# Patient Record
Sex: FEMALE | Race: WHITE | NOT HISPANIC OR LATINO | Employment: OTHER | URBAN - METROPOLITAN AREA
[De-identification: names, ages, dates, MRNs, and addresses within clinical notes are randomized per-mention and may not be internally consistent; named-entity substitution may affect disease eponyms.]

---

## 2017-11-01 ENCOUNTER — APPOINTMENT (OUTPATIENT)
Dept: LAB | Facility: CLINIC | Age: 77
End: 2017-11-01
Payer: COMMERCIAL

## 2017-11-01 ENCOUNTER — TRANSCRIBE ORDERS (OUTPATIENT)
Dept: LAB | Facility: CLINIC | Age: 77
End: 2017-11-01

## 2017-11-01 DIAGNOSIS — E55.9 AVITAMINOSIS D: ICD-10-CM

## 2017-11-01 DIAGNOSIS — Z13.9 SCREENING FOR CONDITION: Primary | ICD-10-CM

## 2017-11-01 LAB
25(OH)D3 SERPL-MCNC: 48.4 NG/ML (ref 30–100)
ALBUMIN SERPL BCP-MCNC: 4.1 G/DL (ref 3.5–5)
ALP SERPL-CCNC: 75 U/L (ref 46–116)
ALT SERPL W P-5'-P-CCNC: 18 U/L (ref 12–78)
ANION GAP SERPL CALCULATED.3IONS-SCNC: 11 MMOL/L (ref 4–13)
AST SERPL W P-5'-P-CCNC: 25 U/L (ref 5–45)
BILIRUB SERPL-MCNC: 1.2 MG/DL (ref 0.2–1)
BUN SERPL-MCNC: 29 MG/DL (ref 5–25)
CALCIUM SERPL-MCNC: 9.9 MG/DL (ref 8.3–10.1)
CHLORIDE SERPL-SCNC: 102 MMOL/L (ref 100–108)
CHOLEST SERPL-MCNC: 257 MG/DL (ref 50–200)
CO2 SERPL-SCNC: 25 MMOL/L (ref 21–32)
CREAT SERPL-MCNC: 1.32 MG/DL (ref 0.6–1.3)
ERYTHROCYTE [DISTWIDTH] IN BLOOD BY AUTOMATED COUNT: 12.6 % (ref 11.6–15.1)
GFR SERPL CREATININE-BSD FRML MDRD: 39 ML/MIN/1.73SQ M
GLUCOSE P FAST SERPL-MCNC: 93 MG/DL (ref 65–99)
HCT VFR BLD AUTO: 43.5 % (ref 34.8–46.1)
HDLC SERPL-MCNC: 133 MG/DL (ref 40–60)
HGB BLD-MCNC: 14.1 G/DL (ref 11.5–15.4)
LDLC SERPL CALC-MCNC: 108 MG/DL (ref 0–100)
MCH RBC QN AUTO: 31.4 PG (ref 26.8–34.3)
MCHC RBC AUTO-ENTMCNC: 32.4 G/DL (ref 31.4–37.4)
MCV RBC AUTO: 97 FL (ref 82–98)
PLATELET # BLD AUTO: 241 THOUSANDS/UL (ref 149–390)
PMV BLD AUTO: 11.9 FL (ref 8.9–12.7)
POTASSIUM SERPL-SCNC: 4.2 MMOL/L (ref 3.5–5.3)
PROT SERPL-MCNC: 7.5 G/DL (ref 6.4–8.2)
RBC # BLD AUTO: 4.49 MILLION/UL (ref 3.81–5.12)
SODIUM SERPL-SCNC: 138 MMOL/L (ref 136–145)
T3 SERPL-MCNC: 0.9 NG/ML (ref 0.6–1.8)
T4 SERPL-MCNC: 9 UG/DL (ref 4.7–13.3)
TRIGL SERPL-MCNC: 80 MG/DL
TSH SERPL DL<=0.05 MIU/L-ACNC: 2.08 UIU/ML (ref 0.36–3.74)
WBC # BLD AUTO: 5.89 THOUSAND/UL (ref 4.31–10.16)

## 2017-11-01 PROCEDURE — 84443 ASSAY THYROID STIM HORMONE: CPT

## 2017-11-01 PROCEDURE — 84480 ASSAY TRIIODOTHYRONINE (T3): CPT

## 2017-11-01 PROCEDURE — 84436 ASSAY OF TOTAL THYROXINE: CPT

## 2017-11-01 PROCEDURE — 85027 COMPLETE CBC AUTOMATED: CPT

## 2017-11-01 PROCEDURE — 80053 COMPREHEN METABOLIC PANEL: CPT

## 2017-11-01 PROCEDURE — 82306 VITAMIN D 25 HYDROXY: CPT

## 2017-11-01 PROCEDURE — 80061 LIPID PANEL: CPT

## 2017-11-01 PROCEDURE — 36415 COLL VENOUS BLD VENIPUNCTURE: CPT

## 2017-11-16 ENCOUNTER — TRANSCRIBE ORDERS (OUTPATIENT)
Dept: ADMINISTRATIVE | Facility: HOSPITAL | Age: 77
End: 2017-11-16

## 2017-11-16 ENCOUNTER — HOSPITAL ENCOUNTER (OUTPATIENT)
Dept: RADIOLOGY | Facility: HOSPITAL | Age: 77
Discharge: HOME | End: 2017-11-16
Payer: COMMERCIAL

## 2017-11-16 DIAGNOSIS — M89.9 BONE DISORDER: ICD-10-CM

## 2017-11-16 DIAGNOSIS — M54.5 LOW BACK PAIN, UNSPECIFIED BACK PAIN LATERALITY, UNSPECIFIED CHRONICITY, WITH SCIATICA PRESENCE UNSPECIFIED: ICD-10-CM

## 2017-11-16 DIAGNOSIS — I13.10 BENIGN HYPERTENSIVE HEART AND RENAL DISEASE: Primary | ICD-10-CM

## 2017-11-16 DIAGNOSIS — M54.5 LOW BACK PAIN, UNSPECIFIED BACK PAIN LATERALITY, UNSPECIFIED CHRONICITY, WITH SCIATICA PRESENCE UNSPECIFIED: Primary | ICD-10-CM

## 2017-11-16 PROCEDURE — 72110 X-RAY EXAM L-2 SPINE 4/>VWS: CPT

## 2017-12-04 ENCOUNTER — GENERIC CONVERSION - ENCOUNTER (OUTPATIENT)
Dept: OTHER | Facility: OTHER | Age: 77
End: 2017-12-04

## 2017-12-04 ENCOUNTER — APPOINTMENT (OUTPATIENT)
Dept: LAB | Facility: CLINIC | Age: 77
End: 2017-12-04
Payer: COMMERCIAL

## 2017-12-04 ENCOUNTER — TRANSCRIBE ORDERS (OUTPATIENT)
Dept: LAB | Facility: CLINIC | Age: 77
End: 2017-12-04

## 2017-12-04 DIAGNOSIS — I13.10 BENIGN HYPERTENSIVE HEART AND RENAL DISEASE: Primary | ICD-10-CM

## 2017-12-04 DIAGNOSIS — M54.50 LOW BACK PAIN: ICD-10-CM

## 2017-12-04 DIAGNOSIS — M51.37 OTHER INTERVERTEBRAL DISC DEGENERATION, LUMBOSACRAL REGION: ICD-10-CM

## 2017-12-04 DIAGNOSIS — Z00.00 ENCOUNTER FOR GENERAL ADULT MEDICAL EXAMINATION WITHOUT ABNORMAL FINDINGS: ICD-10-CM

## 2017-12-04 DIAGNOSIS — I13.10 BENIGN HYPERTENSIVE HEART AND RENAL DISEASE: ICD-10-CM

## 2017-12-04 LAB
ANION GAP SERPL CALCULATED.3IONS-SCNC: 8 MMOL/L (ref 4–13)
BUN SERPL-MCNC: 28 MG/DL (ref 5–25)
CALCIUM SERPL-MCNC: 9.6 MG/DL (ref 8.3–10.1)
CHLORIDE SERPL-SCNC: 101 MMOL/L (ref 100–108)
CO2 SERPL-SCNC: 26 MMOL/L (ref 21–32)
CREAT SERPL-MCNC: 0.99 MG/DL (ref 0.6–1.3)
GFR SERPL CREATININE-BSD FRML MDRD: 56 ML/MIN/1.73SQ M
GLUCOSE P FAST SERPL-MCNC: 118 MG/DL (ref 65–99)
POTASSIUM SERPL-SCNC: 4.3 MMOL/L (ref 3.5–5.3)
SODIUM SERPL-SCNC: 135 MMOL/L (ref 136–145)

## 2017-12-04 PROCEDURE — 36415 COLL VENOUS BLD VENIPUNCTURE: CPT

## 2017-12-04 PROCEDURE — 80048 BASIC METABOLIC PNL TOTAL CA: CPT

## 2017-12-08 ENCOUNTER — HOSPITAL ENCOUNTER (OUTPATIENT)
Dept: RADIOLOGY | Age: 77
Discharge: HOME | End: 2017-12-08
Payer: COMMERCIAL

## 2017-12-08 ENCOUNTER — GENERIC CONVERSION - ENCOUNTER (OUTPATIENT)
Dept: OTHER | Facility: OTHER | Age: 77
End: 2017-12-08

## 2017-12-08 DIAGNOSIS — I13.10 BENIGN HYPERTENSIVE HEART AND RENAL DISEASE: ICD-10-CM

## 2017-12-08 DIAGNOSIS — M89.9 BONE DISORDER: ICD-10-CM

## 2017-12-08 DIAGNOSIS — N18.9 CHRONIC KIDNEY DISEASE: ICD-10-CM

## 2017-12-08 PROCEDURE — 77080 DXA BONE DENSITY AXIAL: CPT

## 2017-12-08 PROCEDURE — 76770 US EXAM ABDO BACK WALL COMP: CPT

## 2017-12-15 ENCOUNTER — APPOINTMENT (OUTPATIENT)
Dept: PHYSICAL THERAPY | Facility: CLINIC | Age: 77
End: 2017-12-15
Payer: COMMERCIAL

## 2017-12-15 ENCOUNTER — GENERIC CONVERSION - ENCOUNTER (OUTPATIENT)
Dept: OTHER | Facility: OTHER | Age: 77
End: 2017-12-15

## 2017-12-15 DIAGNOSIS — Z00.00 ENCOUNTER FOR GENERAL ADULT MEDICAL EXAMINATION WITHOUT ABNORMAL FINDINGS: ICD-10-CM

## 2017-12-15 DIAGNOSIS — M54.50 LOW BACK PAIN: ICD-10-CM

## 2017-12-15 DIAGNOSIS — M51.37 OTHER INTERVERTEBRAL DISC DEGENERATION, LUMBOSACRAL REGION: ICD-10-CM

## 2017-12-15 PROCEDURE — G8991 OTHER PT/OT GOAL STATUS: HCPCS

## 2017-12-15 PROCEDURE — 97110 THERAPEUTIC EXERCISES: CPT

## 2017-12-15 PROCEDURE — 97161 PT EVAL LOW COMPLEX 20 MIN: CPT

## 2017-12-15 PROCEDURE — G8990 OTHER PT/OT CURRENT STATUS: HCPCS

## 2018-01-05 ENCOUNTER — APPOINTMENT (OUTPATIENT)
Dept: PHYSICAL THERAPY | Facility: CLINIC | Age: 78
End: 2018-01-05
Payer: COMMERCIAL

## 2018-01-05 PROCEDURE — 97110 THERAPEUTIC EXERCISES: CPT

## 2018-01-05 PROCEDURE — 97112 NEUROMUSCULAR REEDUCATION: CPT

## 2018-01-08 ENCOUNTER — APPOINTMENT (OUTPATIENT)
Dept: PHYSICAL THERAPY | Facility: CLINIC | Age: 78
End: 2018-01-08
Payer: COMMERCIAL

## 2018-01-08 PROCEDURE — 97110 THERAPEUTIC EXERCISES: CPT

## 2018-01-08 PROCEDURE — 97112 NEUROMUSCULAR REEDUCATION: CPT

## 2018-01-08 PROCEDURE — 97140 MANUAL THERAPY 1/> REGIONS: CPT

## 2018-01-10 ENCOUNTER — APPOINTMENT (OUTPATIENT)
Dept: PHYSICAL THERAPY | Facility: CLINIC | Age: 78
End: 2018-01-10
Payer: COMMERCIAL

## 2018-01-10 PROCEDURE — 97110 THERAPEUTIC EXERCISES: CPT

## 2018-01-10 PROCEDURE — 97112 NEUROMUSCULAR REEDUCATION: CPT

## 2018-01-15 ENCOUNTER — APPOINTMENT (OUTPATIENT)
Dept: PHYSICAL THERAPY | Facility: CLINIC | Age: 78
End: 2018-01-15
Payer: COMMERCIAL

## 2018-01-15 ENCOUNTER — GENERIC CONVERSION - ENCOUNTER (OUTPATIENT)
Dept: OTHER | Facility: OTHER | Age: 78
End: 2018-01-15

## 2018-01-15 PROCEDURE — 97110 THERAPEUTIC EXERCISES: CPT

## 2018-01-15 PROCEDURE — G8990 OTHER PT/OT CURRENT STATUS: HCPCS

## 2018-01-15 PROCEDURE — G8991 OTHER PT/OT GOAL STATUS: HCPCS

## 2018-01-15 PROCEDURE — 97112 NEUROMUSCULAR REEDUCATION: CPT

## 2018-01-17 ENCOUNTER — APPOINTMENT (OUTPATIENT)
Dept: PHYSICAL THERAPY | Facility: CLINIC | Age: 78
End: 2018-01-17
Payer: COMMERCIAL

## 2018-01-22 ENCOUNTER — APPOINTMENT (OUTPATIENT)
Dept: PHYSICAL THERAPY | Facility: CLINIC | Age: 78
End: 2018-01-22
Payer: COMMERCIAL

## 2018-01-24 ENCOUNTER — APPOINTMENT (OUTPATIENT)
Dept: PHYSICAL THERAPY | Facility: CLINIC | Age: 78
End: 2018-01-24
Payer: COMMERCIAL

## 2018-01-24 VITALS
DIASTOLIC BLOOD PRESSURE: 85 MMHG | WEIGHT: 108.38 LBS | HEIGHT: 61 IN | BODY MASS INDEX: 20.46 KG/M2 | HEART RATE: 76 BPM | SYSTOLIC BLOOD PRESSURE: 141 MMHG

## 2018-01-24 VITALS
BODY MASS INDEX: 20.46 KG/M2 | SYSTOLIC BLOOD PRESSURE: 149 MMHG | HEIGHT: 61 IN | DIASTOLIC BLOOD PRESSURE: 81 MMHG | WEIGHT: 108.38 LBS | HEART RATE: 81 BPM

## 2018-01-29 ENCOUNTER — APPOINTMENT (OUTPATIENT)
Dept: PHYSICAL THERAPY | Facility: CLINIC | Age: 78
End: 2018-01-29
Payer: COMMERCIAL

## 2018-01-31 ENCOUNTER — APPOINTMENT (OUTPATIENT)
Dept: PHYSICAL THERAPY | Facility: CLINIC | Age: 78
End: 2018-01-31
Payer: COMMERCIAL

## 2018-03-14 ENCOUNTER — OFFICE VISIT (OUTPATIENT)
Dept: NEPHROLOGY | Facility: CLINIC | Age: 78
End: 2018-03-14
Payer: COMMERCIAL

## 2018-03-14 VITALS
HEIGHT: 62 IN | BODY MASS INDEX: 19.69 KG/M2 | DIASTOLIC BLOOD PRESSURE: 82 MMHG | WEIGHT: 107 LBS | SYSTOLIC BLOOD PRESSURE: 142 MMHG

## 2018-03-14 DIAGNOSIS — N18.30 STAGE 3 CHRONIC KIDNEY DISEASE (HCC): Primary | ICD-10-CM

## 2018-03-14 DIAGNOSIS — I12.9 BENIGN HYPERTENSION WITH CKD (CHRONIC KIDNEY DISEASE) STAGE III (HCC): ICD-10-CM

## 2018-03-14 DIAGNOSIS — E87.1 HYPONATREMIA: ICD-10-CM

## 2018-03-14 DIAGNOSIS — N28.1 RENAL CYST, ACQUIRED: ICD-10-CM

## 2018-03-14 DIAGNOSIS — I10 ESSENTIAL HYPERTENSION: ICD-10-CM

## 2018-03-14 DIAGNOSIS — N18.30 BENIGN HYPERTENSION WITH CKD (CHRONIC KIDNEY DISEASE) STAGE III (HCC): ICD-10-CM

## 2018-03-14 LAB
SL AMB  POCT GLUCOSE, UA: NORMAL
SL AMB LEUKOCYTE ESTERASE,UA: NORMAL
SL AMB POCT BILIRUBIN,UA: NORMAL
SL AMB POCT BLOOD,UA: NORMAL
SL AMB POCT CLARITY,UA: CLEAR
SL AMB POCT COLOR,UA: YELLOW
SL AMB POCT KETONES,UA: NORMAL
SL AMB POCT NITRITE,UA: NORMAL
SL AMB POCT PH,UA: 6.5
SL AMB POCT SPECIFIC GRAVITY,UA: 1.01
SL AMB POCT URINE PROTEIN: NORMAL
SL AMB POCT UROBILINOGEN: 0.2

## 2018-03-14 PROCEDURE — 81002 URINALYSIS NONAUTO W/O SCOPE: CPT | Performed by: INTERNAL MEDICINE

## 2018-03-14 PROCEDURE — 99204 OFFICE O/P NEW MOD 45 MIN: CPT | Performed by: INTERNAL MEDICINE

## 2018-03-14 RX ORDER — LANOLIN ALCOHOL/MO/W.PET/CERES
1 CREAM (GRAM) TOPICAL DAILY
COMMUNITY
End: 2020-07-22

## 2018-03-14 RX ORDER — CHLORHEXIDINE/GLYCERIN/HE-CELL
1 JELLY (GRAM) TOPICAL DAILY
COMMUNITY
End: 2020-07-22

## 2018-03-14 RX ORDER — OLMESARTAN MEDOXOMIL AND HYDROCHLOROTHIAZIDE 20/12.5 20; 12.5 MG/1; MG/1
1 TABLET ORAL DAILY
COMMUNITY
End: 2019-03-19

## 2018-03-14 RX ORDER — MULTIVIT WITH MINERALS/LUTEIN
1 TABLET ORAL DAILY
COMMUNITY

## 2018-03-14 NOTE — LETTER
March 14, 2018     Cordell Haskins MD  [address] [place] [address]    Patient: Tom Holloway   YOB: 1940   Date of Visit: 3/14/2018       Dear Dr Thelma Coxr: Thank you for referring Jose J Vaughn to me for evaluation  Below are my notes for this consultation  If you have questions, please do not hesitate to call me  I look forward to following your patient along with you  Sincerely,        Rodrigo Gonzalez MD        CC: No Recipients  Rodrigo Gonzalez MD  3/14/2018  4:09 PM  Sign at close encounter  Ade [age] y o  female MRN: [id number]  Date: 3/14/2018  Reason for consultation:   Chief Complaint   Patient presents with    Consult    Hypertension     ASSESSMENT AND PLAN:  Patient is 80-year-old female with hypertension for more than 15 years, arthritis, comes for initial consultation for renal failure, hypertension and renal cyst management  Likely CKD stage 3, baseline creatinine 0 9 to 1 1  - last serum creatinine 0 9 at baseline and stable  - CKD likely secondary to long-term hypertension  - avoid nephrotoxins or NSAIDs  - renal ultrasound shows relatively normal size kidneys, no hydronephrosis, normal echogenicity, no stones  Bilateral simple renal cysts  Very mild hyponatremia, patient does not have any history of hyponatremia  Her serum sodium usually runs in the normal range  - last serum sodium 135 in December 2017  -overall serum sodium slightly dropped but stable  Will not do any evaluation at this time  I suspect this could be secondary to increased free water intake in the setting of taking HCTZ  Patient recently had an episode of SOM when she was asked to drink plenty of free water which likely contributing   -avoid significant free water intake  Hypertension  -blood pressure slightly elevated and above goal in the office today  She feels that she has been nervous regarding coming to the office    And this is likely contributing   -home blood pressure usually 120s/70s  -will not change current antihypertensive regimen and continue current Benicar/HCTZ 20/12 5 daily   -blood pressure at home and call back if blood pressure remains persistently greater than 140/90  Bring BP machine all BP readings during next visit  Bilateral renal cyst  -renal ultrasound shows three simple cyst on right kidney, two simple cyst on left kidney   -no further intervention at this time needed  May consider repeat renal ultrasound in one year  HISTORY OF PRESENT ILLNESS:  Lizette Garvey is a 68y o  year old female with medical issues of hypertension for more than 15 years, degenerative joint disease, arthritis, who presents for initial consultation for renal failure, hypertension and renal cyst   Old medical records were reviewed  Patient's baseline serum creatinine seems to be in the range of 0 9 to 1 1 going back to 2014  Last serum creatinine 0 9 at stable and at baseline  She recently had an episode of SOM with serum creatinine 1 3 when she was asked to drink plenty of free water and eventually her serum sodium improved  No recent change in her medications and she remains on Benicar/HCTZ, 20/12 5 mg p  o  daily  She checks blood pressure at home which is usually 120s/70s  She denies any urinary complaint including no dysuria, no hematuria, no urgency or hesitancy  Denies any shortness of breath or chest pain  No significant NSAID exposure  REVIEW OF SYSTEMS:    Review of Systems   Constitutional: Negative for chills, fatigue and fever  HENT: Negative for congestion, ear pain and postnasal drip  Eyes: Negative for visual disturbance  Respiratory: Negative for cough and shortness of breath  Cardiovascular: Negative for chest pain and leg swelling  Gastrointestinal: Negative for abdominal pain, diarrhea, nausea and vomiting  Endocrine: Negative for polyuria     Genitourinary: Negative for decreased urine volume, difficulty urinating, dysuria, flank pain, frequency, hematuria and urgency  Musculoskeletal: Negative for arthralgias and back pain  Skin: Negative for rash  Neurological: Negative for dizziness, light-headedness and headaches  Hematological: Does not bruise/bleed easily  Psychiatric/Behavioral: Negative for behavioral problems and confusion  The patient is not nervous/anxious  More than 10 point review of systems were obtained and discussed in length with the patient  Complete review of systems were negative / unremarkable except mentioned in the HPI section  PHYSICAL EXAM:  Vitals:    03/14/18 1458   Weight: 48 5 kg (107 lb)   Height: 5' 2" (1 575 m)     Body mass index is 19 57 kg/m²  Physical Exam   Constitutional: She is oriented to person, place, and time  She appears well-developed and well-nourished  HENT:   Head: Normocephalic and atraumatic  Right Ear: External ear normal    Left Ear: External ear normal    Eyes: Conjunctivae and EOM are normal  Pupils are equal, round, and reactive to light  Neck: Neck supple  No JVD present  Cardiovascular: Normal rate and normal heart sounds  Pulmonary/Chest: Effort normal and breath sounds normal  She has no wheezes  She has no rales  Abdominal: Soft  Bowel sounds are normal  She exhibits no distension  There is no tenderness  Musculoskeletal: She exhibits no edema or tenderness  Neurological: She is alert and oriented to person, place, and time  Skin: Skin is warm and dry  No rash noted  Psychiatric: She has a normal mood and affect  Her behavior is normal    Vitals reviewed        PAST MEDICAL HISTORY:  Past Medical History:   Diagnosis Date    Hypertension        PAST SURGICAL HISTORY:  Past Surgical History:   Procedure Laterality Date    APPENDECTOMY      CERVICAL SPINE SURGERY         ALLERGIES:  No Known Allergies    SOCIAL HISTORY:  History   Alcohol Use    6 0 oz/week    10 Glasses of wine per week History   Drug Use No     History   Smoking Status    Former Smoker   Smokeless Tobacco    Never Used       FAMILY HISTORY:  Family History   Problem Relation Age of Onset    Cancer Mother     Hypertension Sister     Heart disease Brother        MEDICATIONS:    Current Outpatient Prescriptions:     Ascorbic Acid (VITAMIN C) 1000 MG tablet, Take 1 tablet by mouth daily, Disp: , Rfl:     calcium citrate-vitamin D (CITRACAL+D) 315-200 MG-UNIT per tablet, Take 1 tablet by mouth daily, Disp: , Rfl:     olmesartan-hydrochlorothiazide (BENICAR HCT) 20-12 5 MG per tablet, Take 1 tablet by mouth daily, Disp: , Rfl:     Omega-3 Fatty Acids ([employer] FISH OIL) 1000 MG CAPS, Take 1 tablet by mouth daily, Disp: , Rfl:     Lab Results:   Results for orders placed or performed in visit on 03/14/18   POCT urine dip   Result Value Ref Range    LEUKOCYTE ESTERASE,UA NEG      NITRITE,UA NEG     SL AMB POCT UROBILINOGEN 0 2     SL AMB POCT URINE PROTEIN NEG      PH,UA 6 5      BLOOD,UA NEG      SPECIFIC GRAVITY,UA 1 015      KETONES,UA NEG      BILIRUBIN,UA NEG     GLUCOSE, UA NEG      COLOR,UA YELLOW      CLARITY,UA CLEAR      Serum creatinine 0 9 in December 2017, serum sodium 135  Portions of the record may have been created with voice recognition software  Occasional wrong word or "sound a like" substitutions may have occurred due to the inherent limitations of voice recognition software  Read the chart carefully and recognize, using context, where substitutions have occurred

## 2018-03-14 NOTE — PROGRESS NOTES
NEPHROLOGY OUTPATIENT Timur Gonzalez 68 y o  female MRN: [id number]  Date: 3/14/2018  Reason for consultation:   Chief Complaint   Patient presents with    Consult    Hypertension     ASSESSMENT AND PLAN:  Patient is 80-year-old female with hypertension for more than 15 years, arthritis, comes for initial consultation for renal failure, hypertension and renal cyst management  Likely CKD stage 3, baseline creatinine 0 9 to 1 1  - last serum creatinine 0 9 at baseline and stable  - CKD likely secondary to long-term hypertension  - avoid nephrotoxins or NSAIDs  - renal ultrasound shows relatively normal size kidneys, no hydronephrosis, normal echogenicity, no stones  Bilateral simple renal cysts  Very mild hyponatremia, patient does not have any history of hyponatremia  Her serum sodium usually runs in the normal range  - last serum sodium 135 in December 2017  -overall serum sodium slightly dropped but stable  Will not do any evaluation at this time  I suspect this could be secondary to increased free water intake in the setting of taking HCTZ  Patient recently had an episode of SOM when she was asked to drink plenty of free water which likely contributing   -avoid significant free water intake  Hypertension  -blood pressure slightly elevated and above goal in the office today  She feels that she has been nervous regarding coming to the office  And this is likely contributing   -home blood pressure usually 120s/70s  -will not change current antihypertensive regimen and continue current Benicar/HCTZ 20/12 5 daily   -blood pressure at home and call back if blood pressure remains persistently greater than 140/90  Bring BP machine all BP readings during next visit  Bilateral renal cyst  -renal ultrasound shows three simple cyst on right kidney, two simple cyst on left kidney   -no further intervention at this time needed  May consider repeat renal ultrasound in one year      HISTORY OF PRESENT ILLNESS:  Reyna Christensen is a 68y o  year old female with medical issues of hypertension for more than 15 years, degenerative joint disease, arthritis, who presents for initial consultation for renal failure, hypertension and renal cyst   Old medical records were reviewed  Patient's baseline serum creatinine seems to be in the range of 0 9 to 1 1 going back to 2014  Last serum creatinine 0 9 at stable and at baseline  She recently had an episode of SOM with serum creatinine 1 3 when she was asked to drink plenty of free water and eventually her serum sodium improved  No recent change in her medications and she remains on Benicar/HCTZ, 20/12 5 mg p  o  daily  She checks blood pressure at home which is usually 120s/70s  She denies any urinary complaint including no dysuria, no hematuria, no urgency or hesitancy  Denies any shortness of breath or chest pain  No significant NSAID exposure  REVIEW OF SYSTEMS:    Review of Systems   Constitutional: Negative for chills, fatigue and fever  HENT: Negative for congestion, ear pain and postnasal drip  Eyes: Negative for visual disturbance  Respiratory: Negative for cough and shortness of breath  Cardiovascular: Negative for chest pain and leg swelling  Gastrointestinal: Negative for abdominal pain, diarrhea, nausea and vomiting  Endocrine: Negative for polyuria  Genitourinary: Negative for decreased urine volume, difficulty urinating, dysuria, flank pain, frequency, hematuria and urgency  Musculoskeletal: Negative for arthralgias and back pain  Skin: Negative for rash  Neurological: Negative for dizziness, light-headedness and headaches  Hematological: Does not bruise/bleed easily  Psychiatric/Behavioral: Negative for behavioral problems and confusion  The patient is not nervous/anxious  More than 10 point review of systems were obtained and discussed in length with the patient   Complete review of systems were negative / unremarkable except mentioned in the HPI section  PHYSICAL EXAM:  Vitals:    03/14/18 1458   Weight: 48 5 kg (107 lb)   Height: 5' 2" (1 575 m)     Body mass index is 19 57 kg/m²  Physical Exam   Constitutional: She is oriented to person, place, and time  She appears well-developed and well-nourished  HENT:   Head: Normocephalic and atraumatic  Right Ear: External ear normal    Left Ear: External ear normal    Eyes: Conjunctivae and EOM are normal  Pupils are equal, round, and reactive to light  Neck: Neck supple  No JVD present  Cardiovascular: Normal rate and normal heart sounds  Pulmonary/Chest: Effort normal and breath sounds normal  She has no wheezes  She has no rales  Abdominal: Soft  Bowel sounds are normal  She exhibits no distension  There is no tenderness  Musculoskeletal: She exhibits no edema or tenderness  Neurological: She is alert and oriented to person, place, and time  Skin: Skin is warm and dry  No rash noted  Psychiatric: She has a normal mood and affect  Her behavior is normal    Vitals reviewed        PAST MEDICAL HISTORY:  Past Medical History:   Diagnosis Date    Hypertension        PAST SURGICAL HISTORY:  Past Surgical History:   Procedure Laterality Date    APPENDECTOMY      CERVICAL SPINE SURGERY         ALLERGIES:  No Known Allergies    SOCIAL HISTORY:  History   Alcohol Use    6 0 oz/week    10 Glasses of wine per week     History   Drug Use No     History   Smoking Status    Former Smoker   Smokeless Tobacco    Never Used       FAMILY HISTORY:  Family History   Problem Relation Age of Onset    Cancer Mother     Hypertension Sister     Heart disease Brother        MEDICATIONS:    Current Outpatient Prescriptions:     Ascorbic Acid (VITAMIN C) 1000 MG tablet, Take 1 tablet by mouth daily, Disp: , Rfl:     calcium citrate-vitamin D (CITRACAL+D) 315-200 MG-UNIT per tablet, Take 1 tablet by mouth daily, Disp: , Rfl:     olmesartan-hydrochlorothiazide (BENICAR HCT) 20-12 5 MG per tablet, Take 1 tablet by mouth daily, Disp: , Rfl:     Omega-3 Fatty Acids ([employer] FISH OIL) 1000 MG CAPS, Take 1 tablet by mouth daily, Disp: , Rfl:     Lab Results:   Results for orders placed or performed in visit on 03/14/18   POCT urine dip   Result Value Ref Range    LEUKOCYTE ESTERASE,UA NEG      NITRITE,UA NEG     SL AMB POCT UROBILINOGEN 0 2     SL AMB POCT URINE PROTEIN NEG      PH,UA 6 5      BLOOD,UA NEG      SPECIFIC GRAVITY,UA 1 015      KETONES,UA NEG      BILIRUBIN,UA NEG     GLUCOSE, UA NEG      COLOR,UA YELLOW      CLARITY,UA CLEAR      Serum creatinine 0 9 in December 2017, serum sodium 135  Portions of the record may have been created with voice recognition software  Occasional wrong word or "sound a like" substitutions may have occurred due to the inherent limitations of voice recognition software  Read the chart carefully and recognize, using context, where substitutions have occurred

## 2018-03-14 NOTE — PATIENT INSTRUCTIONS
Chronic Kidney Disease   AMBULATORY CARE:   Chronic kidney disease (CKD)  is the gradual and permanent loss of kidney function  Normally, the kidneys remove fluid, chemicals, and waste from your blood  These wastes are turned into urine by your kidneys  CKD may worsen over time and lead to kidney failure  Common symptoms include the following:   · Changes in how often you need to urinate    · Swelling in your arms, legs, or feet    · Shortness of breath    · Fatigue or weakness    · Bad or bitter taste in your mouth    · Nausea, vomiting, or loss of appetite  Seek care immediately if:   · You are confused and very drowsy  · You have a seizure  · You have shortness of breath  Contact your healthcare provider if:   · You suddenly gain or lose more weight than your healthcare provider has told you is okay  · You have itchy skin or a rash  · You urinate more or less than you normally do  · You have blood in your urine  · You have nausea and repeated vomiting  · You have fatigue or muscle weakness  · You have hiccups that will not stop  · You have questions or concerns about your condition or care  Treatment for CKD:  Medicines may be given to decrease blood pressure and get rid of extra fluid  You may also receive medicine to manage health conditions that may occur with CKD  Dialysis is a treatment to remove chemicals and waste from your blood when your kidneys can no longer do this  Surgery may be needed to create an arteriovenous fistula (AVF) in your arm or insert a catheter into your abdomen so that you can receive dialysis  A kidney transplant may be done if your CKD becomes severe  Manage CKD:   · Maintain a healthy weight  Ask your healthcare provider how much you should weigh  Ask him to help you create a weight loss plan if you are overweight  · Exercise 30 to 60 minutes a day, 4 to 7 times a week, or as directed  Ask about the best exercise plan for you   Regular exercise can help you manage CKD, high blood pressure, and diabetes  · Follow your healthcare provider's advice about what to eat and drink  He may tell you to eat food low in sodium (salt), potassium, phosphorus, or protein  You may need to see a dietitian if you need help planning meals  Ask how much liquid to drink each day and which liquids are best for you  · Limit alcohol  Ask how much alcohol is safe for you to drink  A drink of alcohol is 12 ounces of beer, 5 ounces of wine, or 1½ ounces of liquor  · Do not smoke  Nicotine and other chemicals in cigarettes and cigars can cause lung and kidney damage  Ask your healthcare provider for information if you currently smoke and need help to quit  E-cigarettes or smokeless tobacco still contain nicotine  Talk to your healthcare provider before you use these products  · Ask your healthcare provider if you need vaccines  Infections such as pneumonia, influenza, and hepatitis can be more harmful or more likely to occur in a person who has CKD  Vaccines reduce your risk of infection with these viruses  Follow up with your healthcare provider as directed:  Write down your questions so you remember to ask them during your visits  © 2017 [address] Information is for End User's use only and may not be sold, redistributed or otherwise used for commercial purposes  All illustrations and images included in CareNotes® are the copyrighted property of [employer]  or [employer]  The above information is an  only  It is not intended as medical advice for individual conditions or treatments  Talk to your doctor, nurse or pharmacist before following any medical regimen to see if it is safe and effective for you  Low-Sodium Diet   WHAT YOU NEED TO KNOW:   A low-sodium diet limits foods that are high in sodium (salt)   You will need to follow a low-sodium diet if you have high blood pressure, kidney disease, or heart failure  You may also need to follow this diet if you have a condition that is causing your body to retain (hold) extra fluid  You may need to limit the amount of sodium you eat to 1,500 mg  Ask your healthcare provider how much sodium you can have each day  DISCHARGE INSTRUCTIONS:   How to use food labels to choose foods that are low in sodium:  Read food labels to find the amount of sodium they contain  The amount of sodium is listed in milligrams (mg)  The % Daily Value (DV) column tells you how much of your daily needs are met by 1 serving of the food for each nutrient listed  Choose foods that have less than 5% of the DV of sodium  These foods are considered low in sodium  Foods that have 20% or more of the DV of sodium are considered high in sodium  Some food labels may also list any of the following terms that tell you about the sodium content in the food:  · Sodium-free:  Less than 5 mg in each serving    · Very low sodium:  35 mg of sodium or less in each serving    · Low sodium:  140 mg of sodium or less in each serving    · Reduced sodium: At least 25% less sodium in each serving than the regular type    · Light in sodium:  50% less sodium in each serving    · Unsalted or no added salt:  No extra salt is added during processing (the food may still contain sodium)  Foods to avoid:  Salty foods are high in sodium   You should avoid the following:  · Processed foods:      ¨ Mixes for cornbread, biscuits, cake, and pudding     ¨ Instant foods, such as potatoes, cereals, noodles, and rice     ¨ Packaged foods, such as bread stuffing, rice and pasta mixes, snack dip mixes, and macaroni and cheese     ¨ Canned foods, such as canned vegetables, soups, broths, sauces, and vegetable or tomato juice    ¨ Snack foods, such as salted chips, popcorn, pretzels, pork rinds, salted crackers, and salted nuts    ¨ Frozen foods, such as dinners, entrees, vegetables with sauces, and breaded meats    ¨ Sauerkraut, pickled vegetables, and other foods prepared in brine    · Meats and cheeses:      ¨ Smoked or cured meat, such as corned beef, schroeder, ham, hot dogs, and sausage    ¨ Canned meats or spreads, such as potted meats, sardines, anchovies, and imitation seafood    ¨ Deli or lunch meats, such as bologna, ham, turkey, and roast beef    ¨ Processed cheese, such as American cheese and cheese spreads    · Condiments, sauces, and seasonings:      ¨ Salt (¼ teaspoon of salt contains 575 mg of sodium)    ¨ Seasonings made with salt, such as garlic salt, celery salt, onion salt, and seasoned salt    ¨ Regular soy sauce, barbecue sauce, teriyaki sauce, steak sauce, Worcestershire sauce, and most flavored vinegars    ¨ Canned gravy and mixes     ¨ Regular condiments, such as mustard, ketchup, and salad dressings    ¨ Pickles and olives    ¨ Meat tenderizers and monosodium glutamate (MSG)  Foods to include:  Read the food label to find the amount of sodium in each serving  · Bread and cereal:  Try to choose breads with less than 80 mg of sodium per serving  ¨ Bread, roll, nilo, tortilla, or unsalted crackers  ¨ Ready-to-eat cereals with less than 5% DV of sodium (examples include shredded wheat and puffed rice)    ¨ Pasta    · Vegetables and fruits:      ¨ Unsalted fresh, frozen, or canned vegetables    ¨ Fresh, frozen, or canned fruits    ¨ Fruit juice    · Dairy:  One serving has about 150 mg of sodium  ¨ Milk, all types    ¨ Yogurt    ¨ Hard cheese, such as cheddar, Swiss, [employer], or mozzarella    · Meat and other protein foods:  Some raw meats may have added sodium  ¨ Plain meats, fish, and poultry     ¨ Egg    · Other foods:      ¨ Homemade pudding    ¨ Unsalted nuts, popcorn, or pretzels    ¨ Unsalted butter or margarine  Ways to decrease sodium:   · Add spices and herbs to foods instead of salt during cooking  Use salt-free seasonings to add flavor to foods   Examples include onion powder, garlic powder, basil, arredondo powder, paprika, and parsley  Try lemon or lime juice or vinegar to give foods a tart flavor  Use hot peppers, pepper, or cayenne pepper to add a spicy flavor to foods  · Do not keep a salt shaker at your kitchen table  This may help keep you from adding salt to food at the table  It may take time to get used to enjoying the natural flavor of food instead of adding salt  Talk to your healthcare provider before you use salt substitutes  Some salt substitutes have a high amount of potassium and need to be avoided if you have kidney disease  · Choose low-sodium foods at restaurants  Meals from restaurants are often high in sodium  Some restaurants have nutrition information on the menu that tells you the amount of sodium in their foods  If possible, ask for your food to be prepared with less, or no salt  · Shop for unsalted or low-sodium foods and snacks at the grocery store  Examples include unsalted or low-sodium broths, soups, and canned vegetables  Choose fresh or frozen vegetables instead  Choose unsalted nuts or seeds or fresh fruits or vegetables as snacks  Read food labels and choose salt-free, very low-sodium, or low-sodium foods  © 2017 [address] Information is for End User's use only and may not be sold, redistributed or otherwise used for commercial purposes  All illustrations and images included in CareNotes® are the copyrighted property of [employer]  or [employer]  The above information is an  only  It is not intended as medical advice for individual conditions or treatments  Talk to your doctor, nurse or pharmacist before following any medical regimen to see if it is safe and effective for you

## 2018-11-09 ENCOUNTER — APPOINTMENT (OUTPATIENT)
Dept: LAB | Facility: CLINIC | Age: 78
End: 2018-11-09
Payer: COMMERCIAL

## 2018-11-09 ENCOUNTER — TRANSCRIBE ORDERS (OUTPATIENT)
Dept: LAB | Facility: CLINIC | Age: 78
End: 2018-11-09

## 2018-11-09 DIAGNOSIS — N18.30 STAGE 3 CHRONIC KIDNEY DISEASE (HCC): ICD-10-CM

## 2018-11-09 DIAGNOSIS — E55.9 AVITAMINOSIS D: ICD-10-CM

## 2018-11-09 DIAGNOSIS — Z13.9 SCREENING FOR CONDITION: Primary | ICD-10-CM

## 2018-11-09 DIAGNOSIS — E78.9 DISORDER OF LIPOPROTEIN AND LIPID METABOLISM: ICD-10-CM

## 2018-11-09 LAB
25(OH)D3 SERPL-MCNC: 51.3 NG/ML (ref 30–100)
ALBUMIN SERPL BCP-MCNC: 4.1 G/DL (ref 3.5–5)
ALP SERPL-CCNC: 68 U/L (ref 46–116)
ALT SERPL W P-5'-P-CCNC: 23 U/L (ref 12–78)
ANION GAP SERPL CALCULATED.3IONS-SCNC: 8 MMOL/L (ref 4–13)
AST SERPL W P-5'-P-CCNC: 31 U/L (ref 5–45)
BILIRUB SERPL-MCNC: 1.21 MG/DL (ref 0.2–1)
BUN SERPL-MCNC: 29 MG/DL (ref 5–25)
CALCIUM SERPL-MCNC: 9.4 MG/DL (ref 8.3–10.1)
CHLORIDE SERPL-SCNC: 100 MMOL/L (ref 100–108)
CHOLEST SERPL-MCNC: 280 MG/DL (ref 50–200)
CO2 SERPL-SCNC: 26 MMOL/L (ref 21–32)
CREAT SERPL-MCNC: 1.24 MG/DL (ref 0.6–1.3)
CREAT UR-MCNC: 41.5 MG/DL
ERYTHROCYTE [DISTWIDTH] IN BLOOD BY AUTOMATED COUNT: 12.1 % (ref 11.6–15.1)
GFR SERPL CREATININE-BSD FRML MDRD: 42 ML/MIN/1.73SQ M
GLUCOSE P FAST SERPL-MCNC: 85 MG/DL (ref 65–99)
HCT VFR BLD AUTO: 46.4 % (ref 34.8–46.1)
HDLC SERPL-MCNC: 131 MG/DL (ref 40–60)
HGB BLD-MCNC: 15.1 G/DL (ref 11.5–15.4)
LDLC SERPL CALC-MCNC: 134 MG/DL (ref 0–100)
MCH RBC QN AUTO: 32.8 PG (ref 26.8–34.3)
MCHC RBC AUTO-ENTMCNC: 32.5 G/DL (ref 31.4–37.4)
MCV RBC AUTO: 101 FL (ref 82–98)
MICROALBUMIN UR-MCNC: <5 MG/L (ref 0–20)
MICROALBUMIN/CREAT 24H UR: <12 MG/G CREATININE (ref 0–30)
NONHDLC SERPL-MCNC: 149 MG/DL
PLATELET # BLD AUTO: 215 THOUSANDS/UL (ref 149–390)
PMV BLD AUTO: 11.9 FL (ref 8.9–12.7)
POTASSIUM SERPL-SCNC: 4.3 MMOL/L (ref 3.5–5.3)
PROT SERPL-MCNC: 7.8 G/DL (ref 6.4–8.2)
RBC # BLD AUTO: 4.61 MILLION/UL (ref 3.81–5.12)
SODIUM SERPL-SCNC: 134 MMOL/L (ref 136–145)
T3FREE SERPL-MCNC: 2.57 PG/ML (ref 2.3–4.2)
TRIGL SERPL-MCNC: 75 MG/DL
TSH SERPL DL<=0.05 MIU/L-ACNC: 1.58 UIU/ML (ref 0.36–3.74)
WBC # BLD AUTO: 5.74 THOUSAND/UL (ref 4.31–10.16)

## 2018-11-09 PROCEDURE — 84481 FREE ASSAY (FT-3): CPT

## 2018-11-09 PROCEDURE — 82570 ASSAY OF URINE CREATININE: CPT

## 2018-11-09 PROCEDURE — 82043 UR ALBUMIN QUANTITATIVE: CPT

## 2018-11-09 PROCEDURE — 85027 COMPLETE CBC AUTOMATED: CPT

## 2018-11-09 PROCEDURE — 82306 VITAMIN D 25 HYDROXY: CPT

## 2018-11-09 PROCEDURE — 84443 ASSAY THYROID STIM HORMONE: CPT

## 2018-11-09 PROCEDURE — 36415 COLL VENOUS BLD VENIPUNCTURE: CPT

## 2018-11-09 PROCEDURE — 80061 LIPID PANEL: CPT

## 2018-11-09 PROCEDURE — 80053 COMPREHEN METABOLIC PANEL: CPT

## 2018-11-25 ENCOUNTER — TELEPHONE (OUTPATIENT)
Dept: OTHER | Facility: HOSPITAL | Age: 78
End: 2018-11-25

## 2018-11-26 NOTE — TELEPHONE ENCOUNTER
Can you let her know that Cr 1 2 which is slight higher than her usual baseline but will monitor for now  Also sodium slight lower at 134 which could be due to HCTZ she is taking  I would recommend mild fluid restriction less than 2 L per day fluid intake  She should get repeat BMP in early January

## 2018-11-28 DIAGNOSIS — E87.1 HYPONATREMIA: Primary | ICD-10-CM

## 2018-11-28 NOTE — TELEPHONE ENCOUNTER
Spoke with the patient and she is aware of her lab test results and she is aware of her sodium level and to follow a fluid restriction of 2 L per day and repeat a BMP in January 2019 which I have mailed out

## 2019-01-07 ENCOUNTER — APPOINTMENT (OUTPATIENT)
Dept: LAB | Facility: CLINIC | Age: 79
End: 2019-01-07
Payer: COMMERCIAL

## 2019-01-07 ENCOUNTER — TELEPHONE (OUTPATIENT)
Dept: NEPHROLOGY | Facility: CLINIC | Age: 79
End: 2019-01-07

## 2019-01-07 DIAGNOSIS — E87.1 HYPONATREMIA: ICD-10-CM

## 2019-01-07 LAB
ANION GAP SERPL CALCULATED.3IONS-SCNC: 10 MMOL/L (ref 4–13)
BUN SERPL-MCNC: 23 MG/DL (ref 5–25)
CALCIUM SERPL-MCNC: 10 MG/DL (ref 8.3–10.1)
CHLORIDE SERPL-SCNC: 101 MMOL/L (ref 100–108)
CO2 SERPL-SCNC: 29 MMOL/L (ref 21–32)
CREAT SERPL-MCNC: 1.04 MG/DL (ref 0.6–1.3)
GFR SERPL CREATININE-BSD FRML MDRD: 52 ML/MIN/1.73SQ M
GLUCOSE P FAST SERPL-MCNC: 85 MG/DL (ref 65–99)
POTASSIUM SERPL-SCNC: 4 MMOL/L (ref 3.5–5.3)
SODIUM SERPL-SCNC: 140 MMOL/L (ref 136–145)

## 2019-01-07 PROCEDURE — 80048 BASIC METABOLIC PNL TOTAL CA: CPT

## 2019-01-07 PROCEDURE — 36415 COLL VENOUS BLD VENIPUNCTURE: CPT

## 2019-01-07 NOTE — TELEPHONE ENCOUNTER
Called and left a voicemail in regards to scheduling patient for her march follow up appointment  Left our call back number so patient can schedule

## 2019-01-10 ENCOUNTER — TELEPHONE (OUTPATIENT)
Dept: NEPHROLOGY | Facility: CLINIC | Age: 79
End: 2019-01-10

## 2019-01-10 NOTE — TELEPHONE ENCOUNTER
Can you let her know that her serum creatinine serum sodium are stable  No changes at this time  Will discuss further during next office visit

## 2019-03-19 ENCOUNTER — OFFICE VISIT (OUTPATIENT)
Dept: NEPHROLOGY | Facility: CLINIC | Age: 79
End: 2019-03-19
Payer: COMMERCIAL

## 2019-03-19 VITALS
DIASTOLIC BLOOD PRESSURE: 64 MMHG | WEIGHT: 108 LBS | HEART RATE: 72 BPM | BODY MASS INDEX: 20.39 KG/M2 | SYSTOLIC BLOOD PRESSURE: 108 MMHG | HEIGHT: 61 IN

## 2019-03-19 DIAGNOSIS — N28.1 RENAL CYST, ACQUIRED: ICD-10-CM

## 2019-03-19 DIAGNOSIS — I12.9 BENIGN HYPERTENSION WITH CKD (CHRONIC KIDNEY DISEASE) STAGE III (HCC): ICD-10-CM

## 2019-03-19 DIAGNOSIS — N18.30 BENIGN HYPERTENSION WITH CKD (CHRONIC KIDNEY DISEASE) STAGE III (HCC): ICD-10-CM

## 2019-03-19 DIAGNOSIS — E87.1 HYPONATREMIA: ICD-10-CM

## 2019-03-19 DIAGNOSIS — N18.30 STAGE 3 CHRONIC KIDNEY DISEASE (HCC): Primary | ICD-10-CM

## 2019-03-19 PROCEDURE — 99214 OFFICE O/P EST MOD 30 MIN: CPT | Performed by: INTERNAL MEDICINE

## 2019-03-19 RX ORDER — OLMESARTAN MEDOXOMIL 20 MG/1
20 TABLET ORAL DAILY
Qty: 90 TABLET | Refills: 3 | Status: SHIPPED | OUTPATIENT
Start: 2019-03-19 | End: 2019-10-17 | Stop reason: SDUPTHER

## 2019-03-19 NOTE — PROGRESS NOTES
NEPHROLOGY OUTPATIENT PROGRESS NOTE   Jaime De Souza 66 y o  female MRN: [id number]  DATE: 3/19/2019  Reason for visit:   Chief Complaint   Patient presents with    Follow-up    Chronic Kidney Disease     ASSESSMENT and PLAN:  Patient is 26-year-old female with hypertension for more than 15 years, arthritis, comes for initial consultation for renal failure, hypertension and renal cyst management      Likely CKD stage 3, baseline creatinine 0 9 to 1 1  - last serum creatinine 1 0 in January 2019 baseline and stable  - CKD likely secondary to long-term hypertension  -prior urinalysis in March 2018 bland, urine microalbumin/creatinine ratio nonsignificant in November 2018    - avoid nephrotoxins or NSAIDs  - renal ultrasound shows relatively normal size kidneys, no hydronephrosis, normal echogenicity, no stones  Bilateral simple renal cysts      Mild hyponatremia, patient does not have any history of hyponatremia  Her serum sodium usually runs in the normal range   -serum sodium has improved at 140 In January 2019    -discontinue HCTZ as mentioned below   -Will not do any evaluation at this time  I suspect prior episodes of hyponatremia could be secondary to increased free water intake in the setting of taking HCTZ       Hypertension  -blood pressure on lower side in the office today  She also mentions of feeling lightheaded/dizzy occasionally  -currently on Benicar/HCTZ 20/12 5 mg p o  Daily  Will discontinue HCTZ and continue Benicar 20 mg p o  Daily   -advised to continue to monitor blood pressure at home and call back if blood pressure remains persistently greater than 130/90  Bring BP machine all BP readings during next visit  Patient has not brought BP machine to the office visit today      Bilateral renal cyst  --renal ultrasound shows three simple cyst on right kidney, two simple cyst on left kidney   -no further intervention at this time needed    May consider repeat renal ultrasound in the future  Diagnoses and all orders for this visit:    Stage 3 chronic kidney disease ([address] )  -     Basic metabolic panel; Future  -     Microalbumin / creatinine urine ratio; Future    Benign hypertension with CKD (chronic kidney disease) stage III (HCC)  -     olmesartan (BENICAR) 20 mg tablet; Take 1 tablet (20 mg total) by mouth daily  -     Basic metabolic panel; Future    Hyponatremia  -     Basic metabolic panel; Future    Renal cyst, acquired          SUBJECTIVE / HPI:  Brandee Jung is a 66y o  year old female with medical issues of hypertension for more than 15 years, degenerative joint disease, arthritis, who presents for regular follow-up for renal failure, hypertension and renal cyst  Patient's baseline serum creatinine seems to be in the range of 0 9 to 1 1 going back to 2014  Last serum creatinine 1 0 at stable and at baseline  No recent change in her medications and she remains on Benicar/HCTZ, 20/12 5 mg p  o  daily  She has not been checking blood pressure regular basis at home lately  She denies any urinary complaint including no dysuria, no hematuria, no urgency or hesitancy  Denies any shortness of breath or chest pain  No significant NSAID exposure  REVIEW OF SYSTEMS:  More than 10 point review of systems were obtained and discussed in length with the patient  Complete review of systems were negative / unremarkable except mentioned above  PHYSICAL EXAM:  Vitals:    03/19/19 1443 03/19/19 1504   BP: 104/58 108/64   BP Location: Left arm    Patient Position: Sitting    Cuff Size: Standard    Pulse: 72    Weight: 49 kg (108 lb)    Height: 5' 1" (1 549 m)      Body mass index is 20 41 kg/m²  Physical Exam   Constitutional: She is oriented to person, place, and time  She appears well-developed and well-nourished  HENT:   Head: Normocephalic and atraumatic  Right Ear: External ear normal    Left Ear: External ear normal    Eyes: Pupils are equal, round, and reactive to light  Conjunctivae and EOM are normal    Neck: Neck supple  No JVD present  Cardiovascular: Normal rate and normal heart sounds  Pulmonary/Chest: Effort normal and breath sounds normal  She has no wheezes  She has no rales  Abdominal: Soft  Bowel sounds are normal  She exhibits no distension  There is no tenderness  Musculoskeletal: She exhibits no edema or tenderness  Neurological: She is alert and oriented to person, place, and time  Skin: Skin is warm and dry  No rash noted  Psychiatric: She has a normal mood and affect  Her behavior is normal    Vitals reviewed        PAST MEDICAL HISTORY:  Past Medical History:   Diagnosis Date    Hypertension        PAST SURGICAL HISTORY:  Past Surgical History:   Procedure Laterality Date    APPENDECTOMY      CERVICAL SPINE SURGERY         SOCIAL HISTORY:  Social History     Substance and Sexual Activity   Alcohol Use Yes    Alcohol/week: 6 0 oz    Types: 10 Glasses of wine per week     Social History     Substance and Sexual Activity   Drug Use No     Social History     Tobacco Use   Smoking Status Former Smoker   Smokeless Tobacco Never Used       FAMILY HISTORY:  Family History   Problem Relation Age of Onset    Cancer Mother     Hypertension Sister     Heart disease Brother        MEDICATIONS:    Current Outpatient Medications:     Ascorbic Acid (VITAMIN C) 1000 MG tablet, Take 1 tablet by mouth daily, Disp: , Rfl:     calcium citrate-vitamin D (CITRACAL+D) 315-200 MG-UNIT per tablet, Take 1 tablet by mouth daily, Disp: , Rfl:     Omega-3 Fatty Acids ([employer] FISH OIL) 1000 MG CAPS, Take 1 tablet by mouth daily, Disp: , Rfl:     olmesartan (BENICAR) 20 mg tablet, Take 1 tablet (20 mg total) by mouth daily, Disp: 90 tablet, Rfl: 3    Lab Results:   Results for orders placed or performed in visit on 04/60/42   Basic metabolic panel   Result Value Ref Range    Sodium 140 136 - 145 mmol/L    Potassium 4 0 3 5 - 5 3 mmol/L    Chloride 101 100 - 108 mmol/L    CO2 29 21 - 32 mmol/L    ANION GAP 10 4 - 13 mmol/L    BUN 23 5 - 25 mg/dL    Creatinine 1 04 0 60 - 1 30 mg/dL    Glucose, Fasting 85 65 - 99 mg/dL    Calcium 10 0 8 3 - 10 1 mg/dL    eGFR 52 ml/min/1 73sq m

## 2019-08-12 ENCOUNTER — APPOINTMENT (OUTPATIENT)
Dept: LAB | Facility: CLINIC | Age: 79
End: 2019-08-12
Payer: COMMERCIAL

## 2019-08-12 ENCOUNTER — TRANSCRIBE ORDERS (OUTPATIENT)
Dept: LAB | Facility: CLINIC | Age: 79
End: 2019-08-12

## 2019-08-12 ENCOUNTER — TRANSCRIBE ORDERS (OUTPATIENT)
Dept: ADMINISTRATIVE | Facility: HOSPITAL | Age: 79
End: 2019-08-12

## 2019-08-12 DIAGNOSIS — R00.2 PALPITATIONS: Primary | ICD-10-CM

## 2019-08-12 DIAGNOSIS — E78.00 ELEVATED LDL CHOLESTEROL LEVEL: ICD-10-CM

## 2019-08-12 DIAGNOSIS — R00.2 PALPITATION: ICD-10-CM

## 2019-08-12 DIAGNOSIS — R00.2 PALPITATION: Primary | ICD-10-CM

## 2019-08-12 DIAGNOSIS — I10 HYPERTENSION, UNSPECIFIED TYPE: ICD-10-CM

## 2019-08-12 LAB
ANION GAP SERPL CALCULATED.3IONS-SCNC: 13 MMOL/L (ref 4–13)
BUN SERPL-MCNC: 19 MG/DL (ref 5–25)
CALCIUM SERPL-MCNC: 8.9 MG/DL (ref 8.3–10.1)
CHLORIDE SERPL-SCNC: 105 MMOL/L (ref 100–108)
CHOLEST SERPL-MCNC: 257 MG/DL (ref 50–200)
CO2 SERPL-SCNC: 24 MMOL/L (ref 21–32)
CREAT SERPL-MCNC: 1.15 MG/DL (ref 0.6–1.3)
GFR SERPL CREATININE-BSD FRML MDRD: 46 ML/MIN/1.73SQ M
GLUCOSE P FAST SERPL-MCNC: 87 MG/DL (ref 65–99)
HDLC SERPL-MCNC: 117 MG/DL (ref 40–60)
LDLC SERPL CALC-MCNC: 132 MG/DL (ref 0–100)
NONHDLC SERPL-MCNC: 140 MG/DL
POTASSIUM SERPL-SCNC: 4.3 MMOL/L (ref 3.5–5.3)
SODIUM SERPL-SCNC: 142 MMOL/L (ref 136–145)
TRIGL SERPL-MCNC: 38 MG/DL
TSH SERPL DL<=0.05 MIU/L-ACNC: 2.03 UIU/ML (ref 0.36–3.74)

## 2019-08-12 PROCEDURE — 80048 BASIC METABOLIC PNL TOTAL CA: CPT

## 2019-08-12 PROCEDURE — 80061 LIPID PANEL: CPT

## 2019-08-12 PROCEDURE — 84443 ASSAY THYROID STIM HORMONE: CPT

## 2019-08-12 PROCEDURE — 36415 COLL VENOUS BLD VENIPUNCTURE: CPT

## 2019-08-19 ENCOUNTER — TELEPHONE (OUTPATIENT)
Dept: NEPHROLOGY | Facility: CLINIC | Age: 79
End: 2019-08-19

## 2019-08-19 NOTE — TELEPHONE ENCOUNTER
I called and left message for patient to call back and schedule follow up appointment in September with Dr Nancy Hickey

## 2019-10-16 ENCOUNTER — TELEPHONE (OUTPATIENT)
Dept: NEPHROLOGY | Facility: CLINIC | Age: 79
End: 2019-10-16

## 2019-10-16 NOTE — TELEPHONE ENCOUNTER
Left message for the patient to call back to see if she had her Sept 2019 lab work completed for her appointment tomorrow      Mireille Fontenot MA

## 2019-10-17 ENCOUNTER — OFFICE VISIT (OUTPATIENT)
Dept: NEPHROLOGY | Facility: CLINIC | Age: 79
End: 2019-10-17
Payer: COMMERCIAL

## 2019-10-17 VITALS
WEIGHT: 108.8 LBS | HEIGHT: 61 IN | BODY MASS INDEX: 20.54 KG/M2 | SYSTOLIC BLOOD PRESSURE: 142 MMHG | HEART RATE: 70 BPM | DIASTOLIC BLOOD PRESSURE: 80 MMHG

## 2019-10-17 DIAGNOSIS — N18.30 STAGE 3 CHRONIC KIDNEY DISEASE (HCC): Primary | ICD-10-CM

## 2019-10-17 DIAGNOSIS — N18.30 BENIGN HYPERTENSION WITH CKD (CHRONIC KIDNEY DISEASE) STAGE III (HCC): ICD-10-CM

## 2019-10-17 DIAGNOSIS — I12.9 BENIGN HYPERTENSION WITH CKD (CHRONIC KIDNEY DISEASE) STAGE III (HCC): ICD-10-CM

## 2019-10-17 DIAGNOSIS — N28.1 RENAL CYST, ACQUIRED: ICD-10-CM

## 2019-10-17 PROCEDURE — 99214 OFFICE O/P EST MOD 30 MIN: CPT | Performed by: INTERNAL MEDICINE

## 2019-10-17 RX ORDER — OLMESARTAN MEDOXOMIL 5 MG/1
TABLET ORAL
Qty: 90 TABLET | Refills: 5 | Status: SHIPPED | OUTPATIENT
Start: 2019-10-17 | End: 2020-02-10 | Stop reason: SDUPTHER

## 2019-10-17 RX ORDER — OLMESARTAN MEDOXOMIL 5 MG/1
5 TABLET ORAL DAILY
Qty: 30 TABLET | Refills: 5 | Status: SHIPPED | OUTPATIENT
Start: 2019-10-17 | End: 2019-10-17 | Stop reason: SDUPTHER

## 2019-10-17 NOTE — PROGRESS NOTES
NEPHROLOGY OUTPATIENT PROGRESS NOTE   Lucho Martinez 66 y o  female MRN: [id number]  DATE: 10/17/2019  Reason for visit:   Chief Complaint   Patient presents with    Follow-up    Chronic Kidney Disease     ASSESSMENT and PLAN:  Likely CKD stage 3, baseline creatinine 0 9 to 1 1  - last serum creatinine 1 1 in August 2019 stable at baseline   - CKD likely secondary to long-term hypertension  -prior urinalysis in March 2018 bland, urine microalbumin/creatinine ratio nonsignificant in November 2018  -repeat urine microalbumin/creatinine ratio, urinalysis, BMP before next visit  - avoid nephrotoxins or NSAIDs  - renal ultrasound shows relatively normal size kidneys, no hydronephrosis, normal echogenicity, no stones   Bilateral simple renal cysts      History of Mild hyponatremia, resolved  Na level 142 in 8/2019   -will do more evaluation if Na drops further       Hypertension  -blood pressure  slightly above goal in the office today   -during last visit HCTZ was discontinued and she remained on Benicar 20 mg daily  Recently in September 2019, due to palpitation issues metoprolol was started by Cardiology and Benicar was discontinued   -since then, her blood pressure seems to be slightly elevated and home BP readings 130s over 70s  -will restart Benicar low-dose 5 mg daily  -her home BP machine was compared with our office readings which are fairly identical   Advised her to call back if blood pressure remains persistently greater than 135/85       Bilateral renal cyst  --renal ultrasound shows three simple cyst on right kidney, two simple cyst on left kidney   -no further intervention at this time needed  May consider repeat renal ultrasound in the future  Diagnoses and all orders for this visit:    Stage 3 chronic kidney disease ([address] )  -     Basic metabolic panel; Future  -     Microalbumin / creatinine urine ratio; Future  -     CBC; Future  -     Phosphorus; Future  -     PTH, intact;  Future  - Magnesium; Future  -     Urinalysis with reflex to microscopic; Future    Benign hypertension with CKD (chronic kidney disease) stage III (HCC)  -     olmesartan (BENICAR) 5 mg tablet; Take 1 tablet (5 mg total) by mouth daily    Renal cyst, acquired    Other orders  -     metoprolol tartrate (LOPRESSOR) 25 mg tablet; Take 25 mg by mouth daily        SUBJECTIVE / HPI:  Aaron Reeves O 56 y  o  year old female with medical issues of hypertension for more than 15 years, degenerative joint disease, arthritis, who presents for regular follow-up for renal failure, hypertension and renal cyst  Patient's baseline serum creatinine seems to be in the range of 0 9 to 1 1 going back to 2014   Last serum creatinine 1 1 in August 2019 at baseline  She was having palpitation issues and was recently started on metoprolol in September 2019 and Benicar was discontinued by Cardiology  Since then her BP readings are generally 130s/70s at home  Jhon Givens denies any urinary complaint including no dysuria, no hematuria, no urgency or hesitancy   Denies any shortness of breath or chest pain  No significant NSAID exposure  REVIEW OF SYSTEMS:  More than 10 point review of systems were obtained and discussed in length with the patient  Complete review of systems were negative / unremarkable except mentioned above  PHYSICAL EXAM:  Vitals:    10/17/19 1255   BP: 138/74   BP Location: Left arm   Patient Position: Sitting   Cuff Size: Adult   Pulse: 70   Weight: 49 4 kg (108 lb 12 8 oz)   Height: 5' 1" (1 549 m)     Body mass index is 20 56 kg/m²  Physical Exam   Constitutional: She is oriented to person, place, and time  She appears well-developed and well-nourished  HENT:   Head: Normocephalic and atraumatic  Right Ear: External ear normal    Left Ear: External ear normal    Eyes: Pupils are equal, round, and reactive to light  Conjunctivae and EOM are normal    Neck: Neck supple  No JVD present     Cardiovascular: Normal rate and normal heart sounds  Pulmonary/Chest: Effort normal and breath sounds normal  She has no wheezes  She has no rales  Abdominal: Soft  Bowel sounds are normal  She exhibits no distension  There is no tenderness  Musculoskeletal: She exhibits no edema or tenderness  Neurological: She is alert and oriented to person, place, and time  Skin: Skin is warm and dry  No rash noted  Psychiatric: She has a normal mood and affect  Her behavior is normal    Vitals reviewed        PAST MEDICAL HISTORY:  Past Medical History:   Diagnosis Date    Hypertension        PAST SURGICAL HISTORY:  Past Surgical History:   Procedure Laterality Date    APPENDECTOMY      CERVICAL SPINE SURGERY         SOCIAL HISTORY:  Social History     Substance and Sexual Activity   Alcohol Use Yes    Alcohol/week: 10 0 standard drinks    Types: 10 Glasses of wine per week     Social History     Substance and Sexual Activity   Drug Use No     Social History     Tobacco Use   Smoking Status Former Smoker   Smokeless Tobacco Never Used       FAMILY HISTORY:  Family History   Problem Relation Age of Onset    Cancer Mother     Hypertension Sister     Heart disease Brother        MEDICATIONS:    Current Outpatient Medications:     Ascorbic Acid (VITAMIN C) 1000 MG tablet, Take 1 tablet by mouth daily, Disp: , Rfl:     metoprolol tartrate (LOPRESSOR) 25 mg tablet, Take 25 mg by mouth daily, Disp: , Rfl:     calcium citrate-vitamin D (CITRACAL+D) 315-200 MG-UNIT per tablet, Take 1 tablet by mouth daily, Disp: , Rfl:     olmesartan (BENICAR) 5 mg tablet, Take 1 tablet (5 mg total) by mouth daily, Disp: 30 tablet, Rfl: 5    Omega-3 Fatty Acids ([employer] FISH OIL) 1000 MG CAPS, Take 1 tablet by mouth daily, Disp: , Rfl:     Lab Results:   Results for orders placed or performed in visit on 58/40/63   Basic metabolic panel   Result Value Ref Range    Sodium 142 136 - 145 mmol/L    Potassium 4 3 3 5 - 5 3 mmol/L    Chloride 105 100 - 108 mmol/L    CO2 24 21 - 32 mmol/L    ANION GAP 13 4 - 13 mmol/L    BUN 19 5 - 25 mg/dL    Creatinine 1 15 0 60 - 1 30 mg/dL    Glucose, Fasting 87 65 - 99 mg/dL    Calcium 8 9 8 3 - 10 1 mg/dL    eGFR 46 ml/min/1 73sq m   Lipid panel   Result Value Ref Range    Cholesterol 257 (H) 50 - 200 mg/dL    Triglycerides 38 <=150 mg/dL    HDL, Direct 117 (H) 40 - 60 mg/dL    LDL Calculated 132 (H) 0 - 100 mg/dL    Non-HDL-Chol (CHOL-HDL) 140 mg/dl   TSH, 3rd generation with Free T4 reflex   Result Value Ref Range    TSH 3RD GENERATON 2 030 0 358 - 3 740 uIU/mL

## 2019-11-07 ENCOUNTER — TRANSCRIBE ORDERS (OUTPATIENT)
Dept: LAB | Facility: CLINIC | Age: 79
End: 2019-11-07

## 2019-11-07 ENCOUNTER — APPOINTMENT (OUTPATIENT)
Dept: LAB | Facility: CLINIC | Age: 79
End: 2019-11-07
Payer: COMMERCIAL

## 2019-11-07 DIAGNOSIS — E03.9 MYXEDEMA HEART DISEASE: ICD-10-CM

## 2019-11-07 DIAGNOSIS — I51.9 MYXEDEMA HEART DISEASE: ICD-10-CM

## 2019-11-07 DIAGNOSIS — Z13.9 SCREENING FOR UNSPECIFIED CONDITION: ICD-10-CM

## 2019-11-07 DIAGNOSIS — E55.9 AVITAMINOSIS D: Primary | ICD-10-CM

## 2019-11-07 DIAGNOSIS — E55.9 AVITAMINOSIS D: ICD-10-CM

## 2019-11-07 LAB
25(OH)D3 SERPL-MCNC: 46.9 NG/ML (ref 30–100)
ALBUMIN SERPL BCP-MCNC: 3.7 G/DL (ref 3.5–5)
ALP SERPL-CCNC: 80 U/L (ref 46–116)
ALT SERPL W P-5'-P-CCNC: 23 U/L (ref 12–78)
ANION GAP SERPL CALCULATED.3IONS-SCNC: 5 MMOL/L (ref 4–13)
AST SERPL W P-5'-P-CCNC: 25 U/L (ref 5–45)
BILIRUB SERPL-MCNC: 1.06 MG/DL (ref 0.2–1)
BUN SERPL-MCNC: 13 MG/DL (ref 5–25)
CALCIUM SERPL-MCNC: 9.3 MG/DL (ref 8.3–10.1)
CHLORIDE SERPL-SCNC: 109 MMOL/L (ref 100–108)
CHOLEST SERPL-MCNC: 231 MG/DL (ref 50–200)
CO2 SERPL-SCNC: 28 MMOL/L (ref 21–32)
CREAT SERPL-MCNC: 0.94 MG/DL (ref 0.6–1.3)
ERYTHROCYTE [DISTWIDTH] IN BLOOD BY AUTOMATED COUNT: 12.3 % (ref 11.6–15.1)
GFR SERPL CREATININE-BSD FRML MDRD: 58 ML/MIN/1.73SQ M
GLUCOSE P FAST SERPL-MCNC: 86 MG/DL (ref 65–99)
HCT VFR BLD AUTO: 44.4 % (ref 34.8–46.1)
HDLC SERPL-MCNC: 100 MG/DL
HGB BLD-MCNC: 13.8 G/DL (ref 11.5–15.4)
LDLC SERPL CALC-MCNC: 115 MG/DL (ref 0–100)
MCH RBC QN AUTO: 31.9 PG (ref 26.8–34.3)
MCHC RBC AUTO-ENTMCNC: 31.1 G/DL (ref 31.4–37.4)
MCV RBC AUTO: 103 FL (ref 82–98)
NONHDLC SERPL-MCNC: 131 MG/DL
PLATELET # BLD AUTO: 176 THOUSANDS/UL (ref 149–390)
PMV BLD AUTO: 12.2 FL (ref 8.9–12.7)
POTASSIUM SERPL-SCNC: 4.1 MMOL/L (ref 3.5–5.3)
PROT SERPL-MCNC: 7 G/DL (ref 6.4–8.2)
RBC # BLD AUTO: 4.32 MILLION/UL (ref 3.81–5.12)
SODIUM SERPL-SCNC: 142 MMOL/L (ref 136–145)
T3FREE SERPL-MCNC: 2.57 PG/ML (ref 2.3–4.2)
TRIGL SERPL-MCNC: 79 MG/DL
TSH SERPL DL<=0.05 MIU/L-ACNC: 1.81 UIU/ML (ref 0.36–3.74)
WBC # BLD AUTO: 4.7 THOUSAND/UL (ref 4.31–10.16)

## 2019-11-07 PROCEDURE — 82306 VITAMIN D 25 HYDROXY: CPT

## 2019-11-07 PROCEDURE — 80053 COMPREHEN METABOLIC PANEL: CPT

## 2019-11-07 PROCEDURE — 36415 COLL VENOUS BLD VENIPUNCTURE: CPT

## 2019-11-07 PROCEDURE — 85027 COMPLETE CBC AUTOMATED: CPT

## 2019-11-07 PROCEDURE — 80061 LIPID PANEL: CPT

## 2019-11-07 PROCEDURE — 84443 ASSAY THYROID STIM HORMONE: CPT

## 2019-11-07 PROCEDURE — 84481 FREE ASSAY (FT-3): CPT

## 2020-02-10 DIAGNOSIS — N18.30 BENIGN HYPERTENSION WITH CKD (CHRONIC KIDNEY DISEASE) STAGE III (HCC): ICD-10-CM

## 2020-02-10 DIAGNOSIS — I12.9 BENIGN HYPERTENSION WITH CKD (CHRONIC KIDNEY DISEASE) STAGE III (HCC): ICD-10-CM

## 2020-02-11 RX ORDER — OLMESARTAN MEDOXOMIL 5 MG/1
5 TABLET ORAL DAILY
Qty: 90 TABLET | Refills: 3 | Status: SHIPPED | OUTPATIENT
Start: 2020-02-11 | End: 2021-01-14 | Stop reason: SDUPTHER

## 2020-04-16 ENCOUNTER — TELEPHONE (OUTPATIENT)
Dept: NEPHROLOGY | Facility: CLINIC | Age: 80
End: 2020-04-16

## 2020-07-09 ENCOUNTER — DOCUMENTATION (OUTPATIENT)
Dept: NEPHROLOGY | Facility: CLINIC | Age: 80
End: 2020-07-09

## 2020-07-09 NOTE — PROGRESS NOTES
Left message for pt making him aware that we are canceling appt for 7/16 with Julia Venus, asked that she calls office to reschedule a virtual appt

## 2020-07-17 ENCOUNTER — APPOINTMENT (OUTPATIENT)
Dept: LAB | Facility: CLINIC | Age: 80
End: 2020-07-17
Payer: COMMERCIAL

## 2020-07-17 ENCOUNTER — TELEPHONE (OUTPATIENT)
Dept: NEPHROLOGY | Facility: CLINIC | Age: 80
End: 2020-07-17

## 2020-07-17 DIAGNOSIS — N18.30 STAGE 3 CHRONIC KIDNEY DISEASE (HCC): ICD-10-CM

## 2020-07-17 LAB
ANION GAP SERPL CALCULATED.3IONS-SCNC: 9 MMOL/L (ref 4–13)
BILIRUB UR QL STRIP: NEGATIVE
BUN SERPL-MCNC: 24 MG/DL (ref 5–25)
CALCIUM SERPL-MCNC: 8.7 MG/DL (ref 8.3–10.1)
CHLORIDE SERPL-SCNC: 98 MMOL/L (ref 100–108)
CLARITY UR: CLEAR
CO2 SERPL-SCNC: 27 MMOL/L (ref 21–32)
COLOR UR: YELLOW
CREAT SERPL-MCNC: 1.05 MG/DL (ref 0.6–1.3)
CREAT UR-MCNC: 25.7 MG/DL
ERYTHROCYTE [DISTWIDTH] IN BLOOD BY AUTOMATED COUNT: 11.9 % (ref 11.6–15.1)
GFR SERPL CREATININE-BSD FRML MDRD: 51 ML/MIN/1.73SQ M
GLUCOSE SERPL-MCNC: 100 MG/DL (ref 65–140)
GLUCOSE UR STRIP-MCNC: NEGATIVE MG/DL
HCT VFR BLD AUTO: 44.1 % (ref 34.8–46.1)
HGB BLD-MCNC: 14 G/DL (ref 11.5–15.4)
HGB UR QL STRIP.AUTO: NEGATIVE
KETONES UR STRIP-MCNC: NEGATIVE MG/DL
LEUKOCYTE ESTERASE UR QL STRIP: NEGATIVE
MAGNESIUM SERPL-MCNC: 1.9 MG/DL (ref 1.6–2.6)
MCH RBC QN AUTO: 32.6 PG (ref 26.8–34.3)
MCHC RBC AUTO-ENTMCNC: 31.7 G/DL (ref 31.4–37.4)
MCV RBC AUTO: 103 FL (ref 82–98)
MICROALBUMIN UR-MCNC: 5 MG/L (ref 0–20)
MICROALBUMIN/CREAT 24H UR: 19 MG/G CREATININE (ref 0–30)
NITRITE UR QL STRIP: NEGATIVE
PH UR STRIP.AUTO: 6 [PH]
PHOSPHATE SERPL-MCNC: 4.2 MG/DL (ref 2.3–4.1)
PLATELET # BLD AUTO: 224 THOUSANDS/UL (ref 149–390)
PMV BLD AUTO: 12.2 FL (ref 8.9–12.7)
POTASSIUM SERPL-SCNC: 4.4 MMOL/L (ref 3.5–5.3)
PROT UR STRIP-MCNC: NEGATIVE MG/DL
PTH-INTACT SERPL-MCNC: 68.9 PG/ML (ref 18.4–80.1)
RBC # BLD AUTO: 4.29 MILLION/UL (ref 3.81–5.12)
SODIUM SERPL-SCNC: 134 MMOL/L (ref 136–145)
SP GR UR STRIP.AUTO: <=1.005 (ref 1–1.03)
UROBILINOGEN UR QL STRIP.AUTO: 0.2 E.U./DL
WBC # BLD AUTO: 8.22 THOUSAND/UL (ref 4.31–10.16)

## 2020-07-17 PROCEDURE — 81003 URINALYSIS AUTO W/O SCOPE: CPT

## 2020-07-17 PROCEDURE — 83970 ASSAY OF PARATHORMONE: CPT

## 2020-07-17 PROCEDURE — 84100 ASSAY OF PHOSPHORUS: CPT

## 2020-07-17 PROCEDURE — 82570 ASSAY OF URINE CREATININE: CPT

## 2020-07-17 PROCEDURE — 80048 BASIC METABOLIC PNL TOTAL CA: CPT

## 2020-07-17 PROCEDURE — 83735 ASSAY OF MAGNESIUM: CPT

## 2020-07-17 PROCEDURE — 36415 COLL VENOUS BLD VENIPUNCTURE: CPT

## 2020-07-17 PROCEDURE — 82043 UR ALBUMIN QUANTITATIVE: CPT

## 2020-07-17 PROCEDURE — 85027 COMPLETE CBC AUTOMATED: CPT

## 2020-07-17 NOTE — TELEPHONE ENCOUNTER
I spoke to the patient, she is aware of her appointment with Dr Deana Collins on 7/22 in [place] office  She will have blood work done at [place] [id number]

## 2020-07-22 ENCOUNTER — OFFICE VISIT (OUTPATIENT)
Dept: NEPHROLOGY | Facility: CLINIC | Age: 80
End: 2020-07-22
Payer: COMMERCIAL

## 2020-07-22 VITALS
TEMPERATURE: 98.6 F | BODY MASS INDEX: 20.32 KG/M2 | WEIGHT: 107.6 LBS | HEIGHT: 61 IN | SYSTOLIC BLOOD PRESSURE: 140 MMHG | DIASTOLIC BLOOD PRESSURE: 80 MMHG

## 2020-07-22 DIAGNOSIS — N18.30 BENIGN HYPERTENSION WITH CKD (CHRONIC KIDNEY DISEASE) STAGE III (HCC): Primary | ICD-10-CM

## 2020-07-22 DIAGNOSIS — N18.30 STAGE 3 CHRONIC KIDNEY DISEASE (HCC): ICD-10-CM

## 2020-07-22 DIAGNOSIS — N28.1 RENAL CYST, ACQUIRED: ICD-10-CM

## 2020-07-22 DIAGNOSIS — I12.9 BENIGN HYPERTENSION WITH CKD (CHRONIC KIDNEY DISEASE) STAGE III (HCC): Primary | ICD-10-CM

## 2020-07-22 DIAGNOSIS — E87.1 HYPONATREMIA: ICD-10-CM

## 2020-07-22 PROCEDURE — 99214 OFFICE O/P EST MOD 30 MIN: CPT | Performed by: INTERNAL MEDICINE

## 2020-07-22 NOTE — PROGRESS NOTES
NEPHROLOGY OUTPATIENT PROGRESS NOTE   Brandee Jung 78 y o  female MRN: [id number]  DATE: 7/22/2020  Reason for visit:   Chief Complaint   Patient presents with    Follow-up    Chronic Kidney Disease     ASSESSMENT and PLAN:  Likely CKD stage 3, baseline creatinine 0 9 to 1 1  - last serum creatinine 1 05 in July 2020 stable at baseline   - CKD likely secondary to long-term hypertension  -UA in July 2020 bland without hematuria or proteinuria  Urine microalbumin/creatinine ratio 19 mg in July 2020    -repeat urine microalbumin/creatinine ratio, BMP before next visit  - avoid nephrotoxins or NSAIDs  - renal ultrasound shows relatively normal size kidneys, no hydronephrosis, normal echogenicity, no stones   Bilateral simple renal cysts      History of Mild hyponatremia, serum sodium slightly dropped 134 in July 2020  She has been trying to drink significant amount of free water to stay hydrated due to hot weather  Consider mild fluid restriction 1 8 L per day  -repeat BMP before next visit       Hypertension  -blood pressure acceptable in the office today  -current regimen includes Benicar 5 mg daily, metoprolol 50 mg daily (mainly for palpitations)  She has not been checking BP at home  Advised to monitor BP at home  -her home BP machine was compared with our office readings previously which were fairly identical   Advised her to call back if blood pressure remains persistently greater than 135/85       Bilateral renal cyst  --renal ultrasound shows 3 simple cyst on right kidney, two simple cyst on left kidney   -no further intervention at this time needed   Will do repeat renal ultrasound before next visit    Diagnoses and all orders for this visit:    Benign hypertension with CKD (chronic kidney disease) stage III (HCC)    Hyponatremia  -     Basic metabolic panel; Future    Stage 3 chronic kidney disease (HCC)  -     Basic metabolic panel; Future  -     Microalbumin / creatinine urine ratio;  Future  - CBC; Future  -     Phosphorus; Future  -     PTH, intact; Future    Renal cyst, acquired  -     US retroperitoneal complete; Future        SUBJECTIVE / HPI:  Lalita Strickland [facility] old female with medical issues of hypertension for more than 15 years, degenerative joint disease, arthritis, who presents for regular follow-up for renal failure, hypertension and renal cyst  Patient's baseline serum creatinine seems to be in the range of 0 9 to 1 1 going back to 2014  Last serum creatinine 1 0 in July 2020  she remains on stable regimen of Benicar and metoprolol  No recent change in medications  She does have BP machine at home although has not been checking it regularly at home lately  She denies any urinary complaint including no dysuria, no hematuria, no urgency or hesitancy  Denies any shortness of breath or chest pain  No significant NSAID exposure  REVIEW OF SYSTEMS:  More than 10 point review of systems were obtained and discussed in length with the patient  Complete review of systems were negative / unremarkable except mentioned above  PHYSICAL EXAM:  Vitals:    07/22/20 1247   BP: 140/80   BP Location: Left arm   Patient Position: Sitting   Cuff Size: Adult   Temp: 98 6 °F (37 °C)   TempSrc: Temporal   Weight: 48 8 kg (107 lb 9 6 oz)   Height: 5' 1" (1 549 m)     Body mass index is 20 33 kg/m²  Physical Exam   Constitutional: She is oriented to person, place, and time  She appears well-developed and well-nourished  HENT:   Head: Normocephalic and atraumatic  Right Ear: External ear normal    Left Ear: External ear normal    Eyes: Pupils are equal, round, and reactive to light  Conjunctivae and EOM are normal    Neck: Neck supple  No JVD present  Cardiovascular: Normal rate and normal heart sounds  Pulmonary/Chest: Effort normal and breath sounds normal  She has no wheezes  She has no rales  Abdominal: Soft  Bowel sounds are normal  She exhibits no distension   There is no tenderness  Musculoskeletal: She exhibits no edema or tenderness  Neurological: She is alert and oriented to person, place, and time  Skin: Skin is warm and dry  No rash noted  Psychiatric: She has a normal mood and affect  Her behavior is normal    Vitals reviewed  PAST MEDICAL HISTORY:  Past Medical History:   Diagnosis Date    Hypertension        PAST SURGICAL HISTORY:  Past Surgical History:   Procedure Laterality Date    APPENDECTOMY      CERVICAL SPINE SURGERY         SOCIAL HISTORY:  Social History     Substance and Sexual Activity   Alcohol Use Yes    Alcohol/week: 10 0 standard drinks    Types: 10 Glasses of wine per week     Social History     Substance and Sexual Activity   Drug Use No     Social History     Tobacco Use   Smoking Status Former Smoker   Smokeless Tobacco Never Used       FAMILY HISTORY:  Family History   Problem Relation Age of Onset    Cancer Mother     Hypertension Sister     Heart disease Brother        MEDICATIONS:    Current Outpatient Medications:     Ascorbic Acid (VITAMIN C) 1000 MG tablet, Take 1 tablet by mouth daily, Disp: , Rfl:     metoprolol tartrate (LOPRESSOR) 25 mg tablet, Take 50 mg by mouth daily , Disp: , Rfl:     olmesartan (BENICAR) 5 mg tablet, Take 1 tablet (5 mg total) by mouth daily, Disp: 90 tablet, Rfl: 3    Lab Results:   Results for orders placed or performed in visit on 21/34/42   Basic metabolic panel   Result Value Ref Range    Sodium 134 (L) 136 - 145 mmol/L    Potassium 4 4 3 5 - 5 3 mmol/L    Chloride 98 (L) 100 - 108 mmol/L    CO2 27 21 - 32 mmol/L    ANION GAP 9 4 - 13 mmol/L    BUN 24 5 - 25 mg/dL    Creatinine 1 05 0 60 - 1 30 mg/dL    Glucose 100 65 - 140 mg/dL    Calcium 8 7 8 3 - 10 1 mg/dL    eGFR 51 ml/min/1 73sq m   Microalbumin / creatinine urine ratio   Result Value Ref Range    Creatinine, Ur 25 7 mg/dL    Microalbum  ,U,Random 5 0 0 0 - 20 0 mg/L    Microalb Creat Ratio 19 0 - 30 mg/g creatinine   CBC   Result Value Ref Range    WBC 8 22 4 31 - 10 16 Thousand/uL    RBC 4 29 3 81 - 5 12 Million/uL    Hemoglobin 14 0 11 5 - 15 4 g/dL    Hematocrit 44 1 34 8 - 46 1 %     (H) 82 - 98 fL    MCH 32 6 26 8 - 34 3 pg    MCHC 31 7 31 4 - 37 4 g/dL    RDW 11 9 11 6 - 15 1 %    Platelets 982 860 - 289 Thousands/uL    MPV 12 2 8 9 - 12 7 fL   Phosphorus   Result Value Ref Range    Phosphorus 4 2 (H) 2 3 - 4 1 mg/dL   PTH, intact   Result Value Ref Range    PTH 68 9 18 4 - 80 1 pg/mL   Magnesium   Result Value Ref Range    Magnesium 1 9 1 6 - 2 6 mg/dL   Urinalysis with reflex to microscopic   Result Value Ref Range    Color, UA Yellow     Clarity, UA Clear     Specific Gravity, UA <=1 005 1 003 - 1 030    pH, UA 6 0 4 5, 5 0, 5 5, 6 0, 6 5, 7 0, 7 5, 8 0    Leukocytes, UA Negative Negative    Nitrite, UA Negative Negative    Protein, UA Negative Negative mg/dl    Glucose, UA Negative Negative mg/dl    Ketones, UA Negative Negative mg/dl    Urobilinogen, UA 0 2 0 2, 1 0 E U /dl E U /dl    Bilirubin, UA Negative Negative    Blood, UA Negative Negative

## 2020-11-13 ENCOUNTER — TRANSCRIBE ORDERS (OUTPATIENT)
Dept: LAB | Facility: CLINIC | Age: 80
End: 2020-11-13

## 2020-11-13 ENCOUNTER — LAB (OUTPATIENT)
Dept: LAB | Facility: CLINIC | Age: 80
End: 2020-11-13
Payer: COMMERCIAL

## 2020-11-13 ENCOUNTER — TRANSCRIBE ORDERS (OUTPATIENT)
Dept: ADMINISTRATIVE | Facility: HOSPITAL | Age: 80
End: 2020-11-13

## 2020-11-13 DIAGNOSIS — Z03.818 ENCNTR FOR OBS FOR SUSP EXPSR TO OTH BIOLG AGENTS RULED OUT: Primary | ICD-10-CM

## 2020-11-13 DIAGNOSIS — Z01.83 ENCOUNTER FOR BLOOD TYPING: ICD-10-CM

## 2020-11-13 DIAGNOSIS — Z01.83 ENCOUNTER FOR BLOOD TYPING: Primary | ICD-10-CM

## 2020-11-13 LAB
ABO GROUP BLD: NORMAL
BLD GP AB SCN SERPL QL: NEGATIVE
RH BLD: POSITIVE
SPECIMEN EXPIRATION DATE: NORMAL

## 2020-11-13 PROCEDURE — 86850 RBC ANTIBODY SCREEN: CPT

## 2020-11-13 PROCEDURE — 86901 BLOOD TYPING SEROLOGIC RH(D): CPT

## 2020-11-13 PROCEDURE — 36415 COLL VENOUS BLD VENIPUNCTURE: CPT

## 2020-11-13 PROCEDURE — 86900 BLOOD TYPING SEROLOGIC ABO: CPT

## 2020-11-14 DIAGNOSIS — Z03.818 ENCNTR FOR OBS FOR SUSP EXPSR TO OTH BIOLG AGENTS RULED OUT: ICD-10-CM

## 2020-11-14 PROCEDURE — U0003 INFECTIOUS AGENT DETECTION BY NUCLEIC ACID (DNA OR RNA); SEVERE ACUTE RESPIRATORY SYNDROME CORONAVIRUS 2 (SARS-COV-2) (CORONAVIRUS DISEASE [COVID-19]), AMPLIFIED PROBE TECHNIQUE, MAKING USE OF HIGH THROUGHPUT TECHNOLOGIES AS DESCRIBED BY CMS-2020-01-R: HCPCS | Performed by: FAMILY MEDICINE

## 2020-11-15 LAB — SARS-COV-2 RNA SPEC QL NAA+PROBE: NOT DETECTED

## 2020-12-14 ENCOUNTER — TRANSCRIBE ORDERS (OUTPATIENT)
Dept: LAB | Facility: CLINIC | Age: 80
End: 2020-12-14

## 2020-12-14 ENCOUNTER — APPOINTMENT (OUTPATIENT)
Dept: LAB | Facility: CLINIC | Age: 80
End: 2020-12-14
Payer: COMMERCIAL

## 2020-12-14 DIAGNOSIS — E87.1 HYPONATREMIA: ICD-10-CM

## 2020-12-14 DIAGNOSIS — N18.30 STAGE 3 CHRONIC KIDNEY DISEASE (HCC): ICD-10-CM

## 2020-12-14 LAB
ANION GAP SERPL CALCULATED.3IONS-SCNC: 8 MMOL/L (ref 4–13)
BUN SERPL-MCNC: 18 MG/DL (ref 5–25)
CALCIUM SERPL-MCNC: 9 MG/DL (ref 8.3–10.1)
CHLORIDE SERPL-SCNC: 107 MMOL/L (ref 100–108)
CO2 SERPL-SCNC: 27 MMOL/L (ref 21–32)
CREAT SERPL-MCNC: 0.88 MG/DL (ref 0.6–1.3)
ERYTHROCYTE [DISTWIDTH] IN BLOOD BY AUTOMATED COUNT: 12.3 % (ref 11.6–15.1)
GFR SERPL CREATININE-BSD FRML MDRD: 63 ML/MIN/1.73SQ M
GLUCOSE P FAST SERPL-MCNC: 93 MG/DL (ref 65–99)
HCT VFR BLD AUTO: 44.8 % (ref 34.8–46.1)
HGB BLD-MCNC: 14.2 G/DL (ref 11.5–15.4)
MCH RBC QN AUTO: 31.7 PG (ref 26.8–34.3)
MCHC RBC AUTO-ENTMCNC: 31.7 G/DL (ref 31.4–37.4)
MCV RBC AUTO: 100 FL (ref 82–98)
PHOSPHATE SERPL-MCNC: 3.5 MG/DL (ref 2.3–4.1)
PLATELET # BLD AUTO: 180 THOUSANDS/UL (ref 149–390)
PMV BLD AUTO: 12.4 FL (ref 8.9–12.7)
POTASSIUM SERPL-SCNC: 3.9 MMOL/L (ref 3.5–5.3)
PTH-INTACT SERPL-MCNC: 33.3 PG/ML (ref 18.4–80.1)
RBC # BLD AUTO: 4.48 MILLION/UL (ref 3.81–5.12)
SODIUM SERPL-SCNC: 142 MMOL/L (ref 136–145)
WBC # BLD AUTO: 5.34 THOUSAND/UL (ref 4.31–10.16)

## 2020-12-14 PROCEDURE — 82043 UR ALBUMIN QUANTITATIVE: CPT

## 2020-12-14 PROCEDURE — 36415 COLL VENOUS BLD VENIPUNCTURE: CPT

## 2020-12-14 PROCEDURE — 83970 ASSAY OF PARATHORMONE: CPT

## 2020-12-14 PROCEDURE — 82570 ASSAY OF URINE CREATININE: CPT

## 2020-12-14 PROCEDURE — 80048 BASIC METABOLIC PNL TOTAL CA: CPT

## 2020-12-14 PROCEDURE — 85027 COMPLETE CBC AUTOMATED: CPT

## 2020-12-14 PROCEDURE — 84100 ASSAY OF PHOSPHORUS: CPT

## 2020-12-15 LAB
CREAT UR-MCNC: 91.8 MG/DL
MICROALBUMIN UR-MCNC: 13.4 MG/L (ref 0–20)
MICROALBUMIN/CREAT 24H UR: 15 MG/G CREATININE (ref 0–30)

## 2020-12-23 ENCOUNTER — TELEPHONE (OUTPATIENT)
Dept: NEPHROLOGY | Facility: CLINIC | Age: 80
End: 2020-12-23

## 2020-12-24 ENCOUNTER — TELEPHONE (OUTPATIENT)
Dept: NEPHROLOGY | Facility: CLINIC | Age: 80
End: 2020-12-24

## 2021-01-04 ENCOUNTER — HOSPITAL ENCOUNTER (OUTPATIENT)
Dept: ULTRASOUND IMAGING | Facility: HOSPITAL | Age: 81
Discharge: HOME | End: 2021-01-04
Attending: INTERNAL MEDICINE
Payer: COMMERCIAL

## 2021-01-04 DIAGNOSIS — N28.1 RENAL CYST, ACQUIRED: ICD-10-CM

## 2021-01-04 PROCEDURE — 76770 US EXAM ABDO BACK WALL COMP: CPT

## 2021-01-14 ENCOUNTER — OFFICE VISIT (OUTPATIENT)
Dept: NEPHROLOGY | Facility: CLINIC | Age: 81
End: 2021-01-14
Payer: COMMERCIAL

## 2021-01-14 VITALS
WEIGHT: 112.8 LBS | BODY MASS INDEX: 21.3 KG/M2 | DIASTOLIC BLOOD PRESSURE: 80 MMHG | SYSTOLIC BLOOD PRESSURE: 156 MMHG | HEIGHT: 61 IN

## 2021-01-14 DIAGNOSIS — I12.9 BENIGN HYPERTENSION WITH CKD (CHRONIC KIDNEY DISEASE) STAGE III (HCC): Primary | ICD-10-CM

## 2021-01-14 DIAGNOSIS — N28.1 RENAL CYST, ACQUIRED: ICD-10-CM

## 2021-01-14 DIAGNOSIS — N18.31 STAGE 3A CHRONIC KIDNEY DISEASE (HCC): ICD-10-CM

## 2021-01-14 DIAGNOSIS — E87.1 HYPONATREMIA: ICD-10-CM

## 2021-01-14 DIAGNOSIS — N18.30 BENIGN HYPERTENSION WITH CKD (CHRONIC KIDNEY DISEASE) STAGE III (HCC): Primary | ICD-10-CM

## 2021-01-14 PROCEDURE — 99214 OFFICE O/P EST MOD 30 MIN: CPT | Performed by: INTERNAL MEDICINE

## 2021-01-14 RX ORDER — OLMESARTAN MEDOXOMIL 5 MG/1
5 TABLET ORAL DAILY
Qty: 90 TABLET | Refills: 3 | Status: SHIPPED | OUTPATIENT
Start: 2021-01-14 | End: 2021-09-02 | Stop reason: SDUPTHER

## 2021-01-14 NOTE — PROGRESS NOTES
NEPHROLOGY OUTPATIENT PROGRESS NOTE   Edilma Carlos [de-identified] y o  female MRN: [id number]  DATE: 1/14/2021  Reason for visit:   Chief Complaint   Patient presents with    Follow-up    Chronic Kidney Disease     ASSESSMENT and PLAN:  Likely CKD stage 3, baseline creatinine 0 9 to 1 1  -last serum creatinine  0 8 in December 2020 stable at baseline  -CKD likely secondary to long-term hypertension  -UA in July 2020 bland without hematuria or proteinuria  Urine microalbumin/creatinine ratio non significant in December 2020    -repeat urine microalbumin/creatinine ratio, BMP before next visit  - avoid nephrotoxins or NSAIDs  - renal ultrasound  In January 2021 shows relatively normal size kidneys, no hydronephrosis, normal echogenicity, no stones   Bilateral simple renal cysts      History of Mild hyponatremia,  now seems to have resolved  Serum sodium 142   -repeat BMP before next visit       Hypertension  -blood pressure above goal in the office today  -  She has not been checking BP regularly at home  She feels her BP could be higher given that she is at doctor's office  Recommended her to check BP at home on a regular basis and call back with all BP readings in next 7 to 10 days  -current regimen includes Benicar 5 mg daily, metoprolol 50 mg daily (mainly for palpitations)  -her home BP machine was compared with our office readings previously which were fairly identical      Bilateral renal cyst  -  Repeat renal ultrasound in early 2021 shows minimal changes small bilateral renal cyst   Will   Monitor next year  Diagnoses and all orders for this visit:    Benign hypertension with CKD (chronic kidney disease) stage III  -     olmesartan (BENICAR) 5 mg tablet; Take 1 tablet (5 mg total) by mouth daily  -     Basic metabolic panel; Future  -     CBC; Future  -     Microalbumin / creatinine urine ratio; Future    Stage 3a chronic kidney disease  -     Basic metabolic panel; Future  -     CBC;  Future  - Microalbumin / creatinine urine ratio; Future    Renal cyst, acquired    Hyponatremia        SUBJECTIVE / HPI:  Irving Russell [facility] [employer] [facility] medical issues of hypertension for more than 15 years, degenerative joint disease, arthritis, who presents for regular follow-up for renal failure, hypertension and renal cyst  Patient's baseline serum creatinine seems to be in the range of 0 9 to 1 1 going back to 2014  Last serum creatinine   Stable at baseline  She has not been checking BP at home  Her BP remains elevated in the office today  Denies any symptoms  she remains on stable regimen of Benicar and metoprolol  She denies any urinary complaint including no dysuria, no hematuria, no urgency or hesitancy  Denies any shortness of breath or chest pain  No significant NSAID exposure  REVIEW OF SYSTEMS:  More than 10 point review of systems were obtained and discussed in length with the patient  Complete review of systems were negative / unremarkable except mentioned above  PHYSICAL EXAM:  Vitals:    01/14/21 0918 01/14/21 0948   BP: 160/88 156/80   BP Location: Left arm    Patient Position: Sitting    Cuff Size: Adult    Weight: 51 2 kg (112 lb 12 8 oz)    Height: 5' 1" (1 549 m)      Body mass index is 21 31 kg/m²  Physical Exam  Vitals signs reviewed  Constitutional:       Appearance: She is well-developed  HENT:      Head: Normocephalic and atraumatic  Right Ear: External ear normal       Left Ear: External ear normal    Eyes:      Conjunctiva/sclera: Conjunctivae normal       Pupils: Pupils are equal, round, and reactive to light  Neck:      Musculoskeletal: Neck supple  Cardiovascular:      Comments:   S1, S2 present  Pulmonary:      Effort: Pulmonary effort is normal       Breath sounds: Normal breath sounds  No wheezing or rales  Abdominal:      General: Bowel sounds are normal  There is no distension  Palpations: Abdomen is soft  Tenderness:  There is no abdominal tenderness  Musculoskeletal:         General: No tenderness  Right lower leg: No edema  Left lower leg: No edema  Skin:     General: Skin is warm and dry  Findings: No rash  Neurological:      Mental Status: She is alert and oriented to person, place, and time  Psychiatric:         Behavior: Behavior normal          PAST MEDICAL HISTORY:  Past Medical History:   Diagnosis Date    Hypertension        PAST SURGICAL HISTORY:  Past Surgical History:   Procedure Laterality Date    APPENDECTOMY      CERVICAL SPINE SURGERY         SOCIAL HISTORY:  Social History     Substance and Sexual Activity   Alcohol Use Yes    Alcohol/week: 10 0 standard drinks    Types: 10 Glasses of wine per week     Social History     Substance and Sexual Activity   Drug Use No     Social History     Tobacco Use   Smoking Status Former Smoker   Smokeless Tobacco Never Used       FAMILY HISTORY:  Family History   Problem Relation Age of Onset    Cancer Mother     Hypertension Sister     Heart disease Brother        MEDICATIONS:    Current Outpatient Medications:     Ascorbic Acid (VITAMIN C) 1000 MG tablet, Take 1 tablet by mouth daily, Disp: , Rfl:     metoprolol tartrate (LOPRESSOR) 25 mg tablet, Take 50 mg by mouth daily , Disp: , Rfl:     olmesartan (BENICAR) 5 mg tablet, Take 1 tablet (5 mg total) by mouth daily, Disp: 90 tablet, Rfl: 3    Lab Results:   Results for orders placed or performed in visit on 49/53/74   Basic metabolic panel   Result Value Ref Range    Sodium 142 136 - 145 mmol/L    Potassium 3 9 3 5 - 5 3 mmol/L    Chloride 107 100 - 108 mmol/L    CO2 27 21 - 32 mmol/L    ANION GAP 8 4 - 13 mmol/L    BUN 18 5 - 25 mg/dL    Creatinine 0 88 0 60 - 1 30 mg/dL    Glucose, Fasting 93 65 - 99 mg/dL    Calcium 9 0 8 3 - 10 1 mg/dL    eGFR 63 ml/min/1 73sq m   Microalbumin / creatinine urine ratio   Result Value Ref Range    Creatinine, Ur 91 8 mg/dL    Microalbum  ,U,Random 13 4 0 0 - 20 0 mg/L Microalb Creat Ratio 15 0 - 30 mg/g creatinine   CBC   Result Value Ref Range    WBC 5 34 4 31 - 10 16 Thousand/uL    RBC 4 48 3 81 - 5 12 Million/uL    Hemoglobin 14 2 11 5 - 15 4 g/dL    Hematocrit 44 8 34 8 - 46 1 %     (H) 82 - 98 fL    MCH 31 7 26 8 - 34 3 pg    MCHC 31 7 31 4 - 37 4 g/dL    RDW 12 3 11 6 - 15 1 %    Platelets [phone] - 123 Thousands/uL    MPV 12 4 8 9 - 12 7 fL   Phosphorus   Result Value Ref Range    Phosphorus 3 5 2 3 - 4 1 mg/dL   PTH, intact   Result Value Ref Range    PTH 33 3 18 4 - 80 1 pg/mL

## 2021-02-12 DIAGNOSIS — Z23 ENCOUNTER FOR IMMUNIZATION: ICD-10-CM

## 2021-03-10 ENCOUNTER — APPOINTMENT (OUTPATIENT)
Dept: LAB | Facility: CLINIC | Age: 81
End: 2021-03-10
Payer: COMMERCIAL

## 2021-03-10 ENCOUNTER — TRANSCRIBE ORDERS (OUTPATIENT)
Dept: LAB | Facility: CLINIC | Age: 81
End: 2021-03-10

## 2021-03-10 DIAGNOSIS — Z03.818 ENCOUNTER FOR PATIENT CONCERN ABOUT EXPOSURE TO INFECTIOUS ORGANISM: ICD-10-CM

## 2021-03-10 DIAGNOSIS — Z03.818 ENCOUNTER FOR PATIENT CONCERN ABOUT EXPOSURE TO INFECTIOUS ORGANISM: Primary | ICD-10-CM

## 2021-03-10 LAB
SARS-COV-2 IGG SERPL QL IA: REACTIVE
SARS-COV-2 IGG+IGM SERPL QL IA: REACTIVE

## 2021-03-10 PROCEDURE — 86769 SARS-COV-2 COVID-19 ANTIBODY: CPT

## 2021-03-10 PROCEDURE — 36415 COLL VENOUS BLD VENIPUNCTURE: CPT

## 2021-05-06 ENCOUNTER — TRANSCRIBE ORDERS (OUTPATIENT)
Dept: LAB | Facility: CLINIC | Age: 81
End: 2021-05-06

## 2021-05-06 ENCOUNTER — APPOINTMENT (OUTPATIENT)
Dept: LAB | Facility: CLINIC | Age: 81
End: 2021-05-06
Payer: COMMERCIAL

## 2021-05-06 DIAGNOSIS — Z01.84 IMMUNITY STATUS TESTING: ICD-10-CM

## 2021-05-06 DIAGNOSIS — Z01.84 IMMUNITY STATUS TESTING: Primary | ICD-10-CM

## 2021-05-06 LAB
SARS-COV-2 IGG SERPL QL IA: REACTIVE
SARS-COV-2 IGG+IGM SERPL QL IA: REACTIVE

## 2021-05-06 PROCEDURE — 86769 SARS-COV-2 COVID-19 ANTIBODY: CPT

## 2021-05-06 PROCEDURE — 36415 COLL VENOUS BLD VENIPUNCTURE: CPT

## 2021-06-02 ENCOUNTER — TELEPHONE (OUTPATIENT)
Dept: NEPHROLOGY | Facility: CLINIC | Age: 81
End: 2021-06-02

## 2021-06-02 NOTE — TELEPHONE ENCOUNTER
I left message for patient to call the office to schedule her July follow up with Dr Adryan Miranda

## 2021-06-07 NOTE — TELEPHONE ENCOUNTER
Patient called and left a message returning our call to schedule follow up  I called patient back but her phone is not accepting calls

## 2021-07-02 ENCOUNTER — APPOINTMENT (OUTPATIENT)
Dept: LAB | Facility: CLINIC | Age: 81
End: 2021-07-02
Payer: COMMERCIAL

## 2021-07-02 DIAGNOSIS — Z11.52 ENCOUNTER FOR SCREENING FOR COVID-19: ICD-10-CM

## 2021-07-02 PROCEDURE — 36415 COLL VENOUS BLD VENIPUNCTURE: CPT

## 2021-07-02 PROCEDURE — 86769 SARS-COV-2 COVID-19 ANTIBODY: CPT

## 2021-07-03 LAB — SARS-COV-2 IGG SERPL QL IA: POSITIVE

## 2021-08-26 ENCOUNTER — APPOINTMENT (OUTPATIENT)
Dept: LAB | Facility: CLINIC | Age: 81
End: 2021-08-26
Payer: COMMERCIAL

## 2021-08-26 DIAGNOSIS — N18.31 STAGE 3A CHRONIC KIDNEY DISEASE (HCC): ICD-10-CM

## 2021-08-26 DIAGNOSIS — I12.9 BENIGN HYPERTENSION WITH CKD (CHRONIC KIDNEY DISEASE) STAGE III (HCC): ICD-10-CM

## 2021-08-26 DIAGNOSIS — N18.30 BENIGN HYPERTENSION WITH CKD (CHRONIC KIDNEY DISEASE) STAGE III (HCC): ICD-10-CM

## 2021-08-26 LAB
ANION GAP SERPL CALCULATED.3IONS-SCNC: 8 MMOL/L (ref 4–13)
BUN SERPL-MCNC: 21 MG/DL (ref 5–25)
CALCIUM SERPL-MCNC: 9 MG/DL (ref 8.3–10.1)
CHLORIDE SERPL-SCNC: 106 MMOL/L (ref 100–108)
CO2 SERPL-SCNC: 27 MMOL/L (ref 21–32)
CREAT SERPL-MCNC: 0.95 MG/DL (ref 0.6–1.3)
CREAT UR-MCNC: 49 MG/DL
ERYTHROCYTE [DISTWIDTH] IN BLOOD BY AUTOMATED COUNT: 11.9 % (ref 11.6–15.1)
GFR SERPL CREATININE-BSD FRML MDRD: 57 ML/MIN/1.73SQ M
GLUCOSE SERPL-MCNC: 133 MG/DL (ref 65–140)
HCT VFR BLD AUTO: 44.2 % (ref 34.8–46.1)
HGB BLD-MCNC: 14 G/DL (ref 11.5–15.4)
MCH RBC QN AUTO: 31.5 PG (ref 26.8–34.3)
MCHC RBC AUTO-ENTMCNC: 31.7 G/DL (ref 31.4–37.4)
MCV RBC AUTO: 100 FL (ref 82–98)
MICROALBUMIN UR-MCNC: 9.5 MG/L (ref 0–20)
MICROALBUMIN/CREAT 24H UR: 19 MG/G CREATININE (ref 0–30)
PLATELET # BLD AUTO: 222 THOUSANDS/UL (ref 149–390)
PMV BLD AUTO: 11.1 FL (ref 8.9–12.7)
POTASSIUM SERPL-SCNC: 4.1 MMOL/L (ref 3.5–5.3)
RBC # BLD AUTO: 4.44 MILLION/UL (ref 3.81–5.12)
SODIUM SERPL-SCNC: 141 MMOL/L (ref 136–145)
WBC # BLD AUTO: 5.82 THOUSAND/UL (ref 4.31–10.16)

## 2021-08-26 PROCEDURE — 82043 UR ALBUMIN QUANTITATIVE: CPT

## 2021-08-26 PROCEDURE — 80048 BASIC METABOLIC PNL TOTAL CA: CPT

## 2021-08-26 PROCEDURE — 85027 COMPLETE CBC AUTOMATED: CPT

## 2021-08-26 PROCEDURE — 82570 ASSAY OF URINE CREATININE: CPT

## 2021-09-02 ENCOUNTER — OFFICE VISIT (OUTPATIENT)
Dept: NEPHROLOGY | Facility: CLINIC | Age: 81
End: 2021-09-02
Payer: COMMERCIAL

## 2021-09-02 VITALS
HEIGHT: 61 IN | WEIGHT: 106.8 LBS | HEART RATE: 67 BPM | SYSTOLIC BLOOD PRESSURE: 152 MMHG | DIASTOLIC BLOOD PRESSURE: 84 MMHG | BODY MASS INDEX: 20.16 KG/M2

## 2021-09-02 DIAGNOSIS — N28.1 RENAL CYST, ACQUIRED: ICD-10-CM

## 2021-09-02 DIAGNOSIS — N18.30 BENIGN HYPERTENSION WITH CKD (CHRONIC KIDNEY DISEASE) STAGE III (HCC): Primary | ICD-10-CM

## 2021-09-02 DIAGNOSIS — I12.9 BENIGN HYPERTENSION WITH CKD (CHRONIC KIDNEY DISEASE) STAGE III (HCC): Primary | ICD-10-CM

## 2021-09-02 DIAGNOSIS — N18.31 STAGE 3A CHRONIC KIDNEY DISEASE (HCC): ICD-10-CM

## 2021-09-02 PROBLEM — E87.1 HYPONATREMIA: Status: RESOLVED | Noted: 2018-03-14 | Resolved: 2021-09-02

## 2021-09-02 PROCEDURE — 99214 OFFICE O/P EST MOD 30 MIN: CPT | Performed by: INTERNAL MEDICINE

## 2021-09-02 RX ORDER — OLMESARTAN MEDOXOMIL 5 MG/1
10 TABLET ORAL DAILY
Qty: 180 TABLET | Refills: 3 | Status: SHIPPED | OUTPATIENT
Start: 2021-09-02 | End: 2022-06-14

## 2021-09-02 NOTE — PROGRESS NOTES
NEPHROLOGY OUTPATIENT PROGRESS NOTE   Narda Steward [de-identified] y o  female MRN: [id number]  DATE: 9/2/2021  Reason for visit:   Chief Complaint   Patient presents with    Follow-up     CKD3     ASSESSMENT and PLAN:  Likely CKD stage 3, baseline creatinine 0 9 to 1 1  -last serum creatinine  0 9 in August 2021 stable  -CKD likely secondary to long-term hypertension  -UA in July 2020 bland without hematuria or proteinuria  Urine microalbumin/creatinine ratio non significant in August 2021    -repeat urine microalbumin/creatinine ratio, BMP before next visit  - avoid nephrotoxins or NSAIDs  - renal ultrasound  In January 2021 shows relatively normal size kidneys, no hydronephrosis, normal echogenicity, no stones   Bilateral simple renal cysts      Hypertension  -blood pressure above goal in the office today   -She has not been checking BP regularly at home    -current regimen includes Benicar 5 mg daily, metoprolol 50 mg daily (mainly for palpitations)  -increase Benicar to 10 mg daily  -her home BP machine was compared with our office readings previously which were fairly identical   Again recommended to monitor BP regularly at home and call back if blood pressure remains persistently greater than 140/90      Bilateral renal cyst  -  Repeat renal ultrasound in early 2021 shows minimal changes in small bilateral renal cyst    repeat renal ultrasound before next visit    Diagnoses and all orders for this visit:    Benign hypertension with CKD (chronic kidney disease) stage III ([address] )  -     Basic metabolic panel; Future  -     CBC; Future  -     Microalbumin / creatinine urine ratio; Future  -     olmesartan (BENICAR) 5 mg tablet; Take 2 tablets (10 mg total) by mouth daily    Stage 3a chronic kidney disease ([address] )  -     Basic metabolic panel; Future  -     CBC; Future  -     Microalbumin / creatinine urine ratio; Future    Renal cyst, acquired  -     US retroperitoneal complete;  Future          SUBJECTIVE / HPI:  Mildredkimmy Estebans Madison is [facility] [employer] medical issues of hypertension for more than 15 years, degenerative joint disease, arthritis, who presents for regular follow-up for renal failure, hypertension and renal cyst  Patient's baseline serum creatinine seems to be in the range of 0 9 to 1 1 going back to 2014  Last serum creatinine stable at baseline  She has not been checking BP at home  Her BP remains elevated in the office today  Denies any symptoms  She denies any urinary complaint including no dysuria, no hematuria, no urgency or hesitancy  Denies any shortness of breath or chest pain  No significant NSAID exposure  REVIEW OF SYSTEMS:  More than 10 point review of systems were obtained and discussed in length with the patient  Complete review of systems were negative / unremarkable except mentioned above  PHYSICAL EXAM:  Vitals:    09/02/21 1016 09/02/21 1057   BP: 148/88 152/84   BP Location: Left arm    Patient Position: Sitting    Cuff Size: Adult    Pulse: 67    Weight: 48 4 kg (106 lb 12 8 oz)    Height: 5' 1" (1 549 m)      Body mass index is 20 18 kg/m²  Physical Exam  Vitals reviewed  Constitutional:       Appearance: She is well-developed  HENT:      Head: Normocephalic and atraumatic  Right Ear: External ear normal       Left Ear: External ear normal    Eyes:      Conjunctiva/sclera: Conjunctivae normal    Pulmonary:      Effort: Pulmonary effort is normal       Breath sounds: Normal breath sounds  No wheezing or rales  Abdominal:      General: Bowel sounds are normal  There is no distension  Palpations: Abdomen is soft  Tenderness: There is no abdominal tenderness  Musculoskeletal:         General: No tenderness  Right lower leg: No edema  Left lower leg: No edema  Lymphadenopathy:      Cervical: No cervical adenopathy  Skin:     Findings: No rash  Neurological:      Mental Status: She is alert and oriented to person, place, and time     Psychiatric: Behavior: Behavior normal          PAST MEDICAL HISTORY:  Past Medical History:   Diagnosis Date    Hypertension        PAST SURGICAL HISTORY:  Past Surgical History:   Procedure Laterality Date    APPENDECTOMY      CERVICAL SPINE SURGERY         SOCIAL HISTORY:  Social History     Substance and Sexual Activity   Alcohol Use Yes    Alcohol/week: 10 0 standard drinks    Types: 10 Glasses of wine per week     Social History     Substance and Sexual Activity   Drug Use No     Social History     Tobacco Use   Smoking Status Former Smoker   Smokeless Tobacco Never Used       FAMILY HISTORY:  Family History   Problem Relation Age of Onset    Cancer Mother     Hypertension Sister     Heart disease Brother        MEDICATIONS:    Current Outpatient Medications:     Ascorbic Acid (VITAMIN C) 1000 MG tablet, Take 1 tablet by mouth daily, Disp: , Rfl:     metoprolol tartrate (LOPRESSOR) 25 mg tablet, Take 50 mg by mouth daily , Disp: , Rfl:     olmesartan (BENICAR) 5 mg tablet, Take 2 tablets (10 mg total) by mouth daily, Disp: 180 tablet, Rfl: 3    Lab Results:   Results for orders placed or performed in visit on 82/26/81   Basic metabolic panel   Result Value Ref Range    Sodium 141 136 - 145 mmol/L    Potassium 4 1 3 5 - 5 3 mmol/L    Chloride 106 100 - 108 mmol/L    CO2 27 21 - 32 mmol/L    ANION GAP 8 4 - 13 mmol/L    BUN 21 5 - 25 mg/dL    Creatinine 0 95 0 60 - 1 30 mg/dL    Glucose 133 65 - 140 mg/dL    Calcium 9 0 8 3 - 10 1 mg/dL    eGFR 57 ml/min/1 73sq m   CBC   Result Value Ref Range    WBC 5 82 4 31 - 10 16 Thousand/uL    RBC 4 44 3 81 - 5 12 Million/uL    Hemoglobin 14 0 11 5 - 15 4 g/dL    Hematocrit 44 2 34 8 - 46 1 %     (H) 82 - 98 fL    MCH 31 5 26 8 - 34 3 pg    MCHC 31 7 31 4 - 37 4 g/dL    RDW 11 9 11 6 - 15 1 %    Platelets [phone] - 514 Thousands/uL    MPV 11 1 8 9 - 12 7 fL   Microalbumin / creatinine urine ratio   Result Value Ref Range    Creatinine, Ur 49 0 mg/dL Microalbum  ,U,Random 9 5 0 0 - 20 0 mg/L    Microalb Creat Ratio 19 0 - 30 mg/g creatinine

## 2021-11-19 ENCOUNTER — APPOINTMENT (OUTPATIENT)
Dept: LAB | Facility: CLINIC | Age: 81
End: 2021-11-19
Payer: COMMERCIAL

## 2021-11-19 DIAGNOSIS — N18.31 STAGE 3A CHRONIC KIDNEY DISEASE (HCC): ICD-10-CM

## 2021-11-19 DIAGNOSIS — R94.6 NONSPECIFIC ABNORMAL RESULTS OF THYROID FUNCTION STUDY: ICD-10-CM

## 2021-11-19 DIAGNOSIS — E78.5 HYPERLIPIDEMIA, UNSPECIFIED HYPERLIPIDEMIA TYPE: ICD-10-CM

## 2021-11-19 DIAGNOSIS — N18.30 BENIGN HYPERTENSION WITH CKD (CHRONIC KIDNEY DISEASE) STAGE III (HCC): ICD-10-CM

## 2021-11-19 DIAGNOSIS — I12.9 BENIGN HYPERTENSION WITH CKD (CHRONIC KIDNEY DISEASE) STAGE III (HCC): ICD-10-CM

## 2021-11-19 DIAGNOSIS — Z13.21 SCREENING FOR MALNUTRITION: ICD-10-CM

## 2021-11-19 LAB
ALBUMIN SERPL BCP-MCNC: 3.8 G/DL (ref 3.5–5)
ALP SERPL-CCNC: 90 U/L (ref 46–116)
ALT SERPL W P-5'-P-CCNC: 20 U/L (ref 12–78)
ANION GAP SERPL CALCULATED.3IONS-SCNC: 13 MMOL/L (ref 4–13)
AST SERPL W P-5'-P-CCNC: 26 U/L (ref 5–45)
BASOPHILS # BLD AUTO: 0.04 THOUSANDS/ΜL (ref 0–0.1)
BASOPHILS NFR BLD AUTO: 1 % (ref 0–1)
BILIRUB SERPL-MCNC: 1.14 MG/DL (ref 0.2–1)
BUN SERPL-MCNC: 20 MG/DL (ref 5–25)
CALCIUM SERPL-MCNC: 9.1 MG/DL (ref 8.3–10.1)
CHLORIDE SERPL-SCNC: 105 MMOL/L (ref 100–108)
CHOLEST SERPL-MCNC: 251 MG/DL (ref 50–200)
CO2 SERPL-SCNC: 24 MMOL/L (ref 21–32)
CREAT SERPL-MCNC: 1.04 MG/DL (ref 0.6–1.3)
EOSINOPHIL # BLD AUTO: 0.22 THOUSAND/ΜL (ref 0–0.61)
EOSINOPHIL NFR BLD AUTO: 3 % (ref 0–6)
ERYTHROCYTE [DISTWIDTH] IN BLOOD BY AUTOMATED COUNT: 12.4 % (ref 11.6–15.1)
GFR SERPL CREATININE-BSD FRML MDRD: 51 ML/MIN/1.73SQ M
GLUCOSE P FAST SERPL-MCNC: 100 MG/DL (ref 65–99)
HCT VFR BLD AUTO: 47.6 % (ref 34.8–46.1)
HDLC SERPL-MCNC: 106 MG/DL
HGB BLD-MCNC: 14.5 G/DL (ref 11.5–15.4)
IMM GRANULOCYTES # BLD AUTO: 0.01 THOUSAND/UL (ref 0–0.2)
IMM GRANULOCYTES NFR BLD AUTO: 0 % (ref 0–2)
LDLC SERPL CALC-MCNC: 134 MG/DL (ref 0–100)
LYMPHOCYTES # BLD AUTO: 3.09 THOUSANDS/ΜL (ref 0.6–4.47)
LYMPHOCYTES NFR BLD AUTO: 39 % (ref 14–44)
MCH RBC QN AUTO: 30.7 PG (ref 26.8–34.3)
MCHC RBC AUTO-ENTMCNC: 30.5 G/DL (ref 31.4–37.4)
MCV RBC AUTO: 101 FL (ref 82–98)
MONOCYTES # BLD AUTO: 0.74 THOUSAND/ΜL (ref 0.17–1.22)
MONOCYTES NFR BLD AUTO: 9 % (ref 4–12)
NEUTROPHILS # BLD AUTO: 3.75 THOUSANDS/ΜL (ref 1.85–7.62)
NEUTS SEG NFR BLD AUTO: 48 % (ref 43–75)
NONHDLC SERPL-MCNC: 145 MG/DL
NRBC BLD AUTO-RTO: 0 /100 WBCS
PLATELET # BLD AUTO: 181 THOUSANDS/UL (ref 149–390)
PMV BLD AUTO: 11.1 FL (ref 8.9–12.7)
POTASSIUM SERPL-SCNC: 4.2 MMOL/L (ref 3.5–5.3)
PROT SERPL-MCNC: 7.4 G/DL (ref 6.4–8.2)
RBC # BLD AUTO: 4.72 MILLION/UL (ref 3.81–5.12)
SODIUM SERPL-SCNC: 142 MMOL/L (ref 136–145)
TRIGL SERPL-MCNC: 55 MG/DL
TSH SERPL DL<=0.05 MIU/L-ACNC: 1.49 UIU/ML (ref 0.36–3.74)
WBC # BLD AUTO: 7.85 THOUSAND/UL (ref 4.31–10.16)

## 2021-11-19 PROCEDURE — 80061 LIPID PANEL: CPT

## 2021-11-19 PROCEDURE — 84443 ASSAY THYROID STIM HORMONE: CPT

## 2021-11-19 PROCEDURE — 80053 COMPREHEN METABOLIC PANEL: CPT

## 2021-11-19 PROCEDURE — 36415 COLL VENOUS BLD VENIPUNCTURE: CPT

## 2021-11-19 PROCEDURE — 85025 COMPLETE CBC W/AUTO DIFF WBC: CPT

## 2021-12-03 ENCOUNTER — TELEPHONE (OUTPATIENT)
Dept: NEPHROLOGY | Facility: CLINIC | Age: 81
End: 2021-12-03

## 2022-02-12 ENCOUNTER — HOSPITAL ENCOUNTER (OUTPATIENT)
Dept: RADIOLOGY | Facility: HOSPITAL | Age: 82
Discharge: HOME | End: 2022-02-12
Payer: COMMERCIAL

## 2022-02-12 DIAGNOSIS — M25.551 RIGHT HIP PAIN: ICD-10-CM

## 2022-02-12 PROCEDURE — 73502 X-RAY EXAM HIP UNI 2-3 VIEWS: CPT

## 2022-03-02 ENCOUNTER — HOSPITAL ENCOUNTER (OUTPATIENT)
Dept: ULTRASOUND IMAGING | Facility: HOSPITAL | Age: 82
Discharge: HOME | End: 2022-03-02
Attending: INTERNAL MEDICINE
Payer: COMMERCIAL

## 2022-03-02 DIAGNOSIS — N28.1 RENAL CYST, ACQUIRED: ICD-10-CM

## 2022-03-02 PROCEDURE — 76770 US EXAM ABDO BACK WALL COMP: CPT

## 2022-03-06 ENCOUNTER — TELEPHONE (OUTPATIENT)
Dept: OTHER | Facility: HOSPITAL | Age: 82
End: 2022-03-06

## 2022-03-15 RX ORDER — METOPROLOL SUCCINATE 50 MG/1
50 TABLET, EXTENDED RELEASE ORAL DAILY
COMMUNITY
Start: 2021-12-13

## 2022-03-17 ENCOUNTER — OFFICE VISIT (OUTPATIENT)
Dept: OBGYN CLINIC | Facility: CLINIC | Age: 82
End: 2022-03-17
Payer: COMMERCIAL

## 2022-03-17 VITALS
DIASTOLIC BLOOD PRESSURE: 92 MMHG | WEIGHT: 108 LBS | BODY MASS INDEX: 20.39 KG/M2 | SYSTOLIC BLOOD PRESSURE: 158 MMHG | HEIGHT: 61 IN | HEART RATE: 67 BPM

## 2022-03-17 DIAGNOSIS — M16.11 PRIMARY OSTEOARTHRITIS OF ONE HIP, RIGHT: Primary | ICD-10-CM

## 2022-03-17 PROCEDURE — 99203 OFFICE O/P NEW LOW 30 MIN: CPT | Performed by: ORTHOPAEDIC SURGERY

## 2022-03-17 NOTE — PROGRESS NOTES
Assessment/Plan:  1  Primary osteoarthritis of one hip, right  Ambulatory Referral to Physical Therapy       Scribe Attestation    I,:  Sue Daley MA am acting as a scribe while in the presence of the attending physician :       I,:  Regine Pedersen DO personally performed the services described in this documentation    as scribed in my presence :             I discussed with Valentine Khan that her signs and symptoms are consistent with right hip osteoarthritis  X-rays were reviewed in the office today  Treatment options were discussed in the form of OTC medications, formal therapy, and a intra-articular right hip steroid injection  I did discuss she may require a total hip replacement in the future however, we will treat with less invasive and non operative treatment at this time  Patient was agreeable to this  A referral was provided to formal therapy  Patient was instructed to take Tylenol OTC as needed for pain  She is unable to take NSAIDs due to kidney disease  Patient was instructed to call the office in 4 weeks if her pain is not improved and she would like to proceed with a intra-articular right hip steroid injection and a order will be placed in her chart  Otherwise, she may follow up with me as needed  Subjective: Martina Izaguirre is a 80 y o  female who presents to the office today for evaluation of right hip pain  Patient states this has been ongoing since the end of December  She denies any known injury or trauma  She notes pain to her groin  She states this is intermittent and increased with crossing her legs, putting on her shoes and socks, and getting in and out of a car  She denies any pain at rest  Patient states she does walk a mile and a half a day and denies any pain or issues with this  Patient did have a x-ray ordered by her PCP which did show right hip osteoarthritis  She was advised to take Tylenol OTC for pain  She has not needed to take anything for pain   She is unable to take NASIDs due to kidney disease  She states her pain is a 3 out of 10 on the pain scale  Review of Systems   Constitutional: Negative for chills and fever  HENT: Negative for drooling and sneezing  Eyes: Negative for redness  Respiratory: Negative for cough and wheezing  Gastrointestinal: Negative for nausea and vomiting  Musculoskeletal: Negative for arthralgias, joint swelling and myalgias  Neurological: Negative for weakness and numbness  Psychiatric/Behavioral: Negative for behavioral problems  The patient is not nervous/anxious  Past Medical History:   Diagnosis Date    Hypertension        Past Surgical History:   Procedure Laterality Date    APPENDECTOMY      CERVICAL SPINE SURGERY         Family History   Problem Relation Age of Onset    Cancer Mother     Hypertension Sister     Heart disease Brother        Social History     Occupational History    Not on file   Tobacco Use    Smoking status: Former Smoker    Smokeless tobacco: Never Used   Substance and Sexual Activity    Alcohol use: Yes     Alcohol/week: 10 0 standard drinks     Types: 10 Glasses of wine per week    Drug use: No    Sexual activity: Never         Current Outpatient Medications:     Ascorbic Acid (VITAMIN C) 1000 MG tablet, Take 1 tablet by mouth daily, Disp: , Rfl:     metoprolol succinate (TOPROL-XL) 50 mg 24 hr tablet, Take 50 mg by mouth daily, Disp: , Rfl:     olmesartan (BENICAR) 5 mg tablet, Take 2 tablets (10 mg total) by mouth daily, Disp: 180 tablet, Rfl: 3    metoprolol tartrate (LOPRESSOR) 25 mg tablet, Take 50 mg by mouth daily , Disp: , Rfl:     No Known Allergies    Objective:  Vitals:    03/17/22 0942   BP: 158/92   Pulse: 67       Right Hip Exam     Range of Motion   The patient has normal right hip ROM      Muscle Strength   Abduction: 5/5   Adduction: 5/5   Flexion: 5/5     Other   Erythema: absent  Sensation: normal  Pulse: present    Comments:  Internal rotation 45-50 with pain  External rotation 45-50 with pain  ROM WNL            Physical Exam  Constitutional:       Appearance: She is well-developed  HENT:      Head: Normocephalic and atraumatic  Eyes:      General:         Right eye: No discharge  Left eye: No discharge  Conjunctiva/sclera: Conjunctivae normal    Cardiovascular:      Rate and Rhythm: Normal rate  Pulmonary:      Effort: Pulmonary effort is normal  No respiratory distress  Musculoskeletal:      Cervical back: Normal range of motion and neck supple  Comments: As noted in HPI   Skin:     General: Skin is warm and dry  Neurological:      Mental Status: She is alert and oriented to person, place, and time  Psychiatric:         Behavior: Behavior normal          Thought Content: Thought content normal          Judgment: Judgment normal          I have personally reviewed pertinent films in PACS and my interpretation is as follows:X-ray right hip performed on 2/12/22 demonstrates right hip osteoarthritis

## 2022-03-31 ENCOUNTER — EVALUATION (OUTPATIENT)
Dept: PHYSICAL THERAPY | Facility: CLINIC | Age: 82
End: 2022-03-31
Payer: COMMERCIAL

## 2022-03-31 DIAGNOSIS — M25.559 HIP PAIN: Primary | ICD-10-CM

## 2022-03-31 PROCEDURE — 97161 PT EVAL LOW COMPLEX 20 MIN: CPT | Performed by: PHYSICAL THERAPIST

## 2022-03-31 PROCEDURE — 97110 THERAPEUTIC EXERCISES: CPT | Performed by: PHYSICAL THERAPIST

## 2022-03-31 NOTE — PROGRESS NOTES
PT Evaluation     Today's date: 3/31/2022  Patient name: Chiquita Purdy  : 1940  MRN: [id number]  Referring provider: Lucio Diop DO  Dx:   Encounter Diagnosis     ICD-10-CM    1  Hip pain  M25 559                   Assessment  Assessment details: Chiquita Purdy is a 80 y o  female with R hip pain due to OA  She presents with moderate groin pain with functional activities,  decreased strength, decreased ROM, and, ambulatory dysfunction consistent with her diagnosis  Due to these impairments, patient has difficulty performing a/iadls, recreational activities and engaging in social activities  Patient's clinical presentation is consistent with their referring diagnosis  Patient would benefit from skilled physical therapy to address their aforementioned impairments, improve their level of function and to improve their overall quality of life   has been given a home exercise program and is in agreement with the plan of care  Thank you for your referral   Impairments: abnormal or restricted ROM, impaired physical strength, lacks appropriate home exercise program and pain with function    Symptom irritability: lowUnderstanding of Dx/Px/POC: excellent  Goals  ST Goals - 2-4 weeks  1  Patient will report decreased pain with activity by at least 2 points within 4 weeks  2  Patient will improve ROM 5-10 degrees within 4 weeks  3  Patient will demonstrate ability to actively correct posture without cueing within 4 weeks  4  Patient will perform IADLs without pain in 2 weeks  5  Patient will increase strength by 25% in 4 weeks    LT Goals - Discharge  1  Patient will improve FOTO score to maximum stated or greater by discharge  2  Patient will return to preferred recreational activity without significant pain increase by discharge   3    Patient will return to all work related activities without pain by discharge     Plan  Patient would benefit from: skilled physical therapy  Referral necessary: No  Planned therapy interventions: manual therapy, joint mobilization, strengthening, stretching, therapeutic activities, therapeutic exercise and home exercise program  Frequency: 2x week  Duration in visits: 20  Duration in weeks: 20  Plan of Care beginning date: 3/31/2022  Plan of Care expiration date: 2022  Treatment plan discussed with: patient        Subjective Evaluation    History of Present Illness  Mechanism of injury: Patient reports that she was walking on sand around Xmas time and she feels that the uneven surface may have triggered R hip pain  CC:  She reports that she has R groin pain  She has difficulty lifting her LE into hip flexion particularly with getting into and out of the car and going up the stairs  Function:  Pain with stairs, getting into and out of a car  She is retired  She is concerned about mowing her grass  She takes a walk daily which is 1 7 miles  She reports that this is a relatively flat surface  She also notes difficulty with putting her shoe on on the R  Patient reports that she wants to be improved so that she can go on a trip to [place] in  prob[place]    Quality of life: excellent    Pain  Current pain ratin  At best pain ratin  At worst pain ratin  Location: stairs  Quality: sharp  Aggravating factors: stair climbing    Social Support  Steps to enter house: yes  Stairs in house: yes   Lives in: multiple-level home    Employment status: not working    Diagnostic Tests  X-ray: abnormal    FCE comments: R hip DJD and Lumbar DJDPatient Goals  Patient goals for therapy: decreased pain, increased strength, improved balance, return to sport/leisure activities, increased motion and independence with ADLs/IADLs          Objective     Lumbar Screen  Lumbar range of motion within normal limits      Active Range of Motion   Left Hip   Flexion: 107 degrees   Extension: 38 degrees   Abduction: 40 degrees   External rotation (90/90): 30 degrees   Internal rotation (90/90): 30 degrees     Right Hip   Flexion: 95 degrees with pain  Extension: 28 degrees   Abduction: 20 degrees with pain  External rotation (90/90): 28 degrees   Internal rotation (90/90): 23 degrees     Strength/Myotome Testing     Left Hip   Normal muscle strength    Right Hip   Normal muscle strength    Additional Strength Details  Pain in groin with R knee extension = 4/5  SLR:  Pain = 4/5  Pain in groin with s/l Hip ABD =     Tests     Right Hip   Positive CHRISTINE  Negative scour  SLR: Negative       Functional Assessment        Single Leg Stance - Eyes Open   Left  Trial 1: 10 seconds    Right  Trial 1: 10 seconds    Comments  Patient with perturbations greater on R than L  5 times sit to stand:  15 seconds             Precautions: Kidney Disease      Manuals 3/31            Hip distraction nv                                                   Neuro Re-Ed                          SLS                                                                              Ther Ex                          Bike             Glute St 5 x :20            Hip ABD st with strap 5x:20            SLR             Supine march             Standing hip flexion st 5 x :20                         Ther Activity                                       Gait Training                                       Modalities

## 2022-04-05 ENCOUNTER — OFFICE VISIT (OUTPATIENT)
Dept: PHYSICAL THERAPY | Facility: CLINIC | Age: 82
End: 2022-04-05
Payer: COMMERCIAL

## 2022-04-05 DIAGNOSIS — M25.559 HIP PAIN: Primary | ICD-10-CM

## 2022-04-05 PROCEDURE — 97110 THERAPEUTIC EXERCISES: CPT

## 2022-04-05 NOTE — PROGRESS NOTES
Daily Note     Today's date: 2022  Patient name: Efra West  : 1940  MRN: [id number]  Referring provider: Kanwal Arias DO  Dx:   Encounter Diagnosis     ICD-10-CM    1  Hip pain  M25 559        Start Time: 1430  Stop Time: 1505  Total time in clinic (min): 35 minutes    Subjective: Pt reports no pain before session, compliant with HEP  She has been feeling good the past few days  Objective: See treatment diary below      Assessment: Tolerated treatment well  Remains asymptomatic throughout session  Patient demonstrated fatigue post treatment      Plan: Continue per plan of care        Precautions: Kidney Disease      Manuals 3/31 04/05           Hip distraction nv No PT avail                                                  Neuro Re-Ed                          SLS  10" x10                                                                            Ther Ex                          Bike  6 min            Glute St 5 x :20 5x :20           Hip ABD st with strap 5x:20 5x :20            SLR  2x10            Supine march  2x10            Standing hip flexion st 5 x :20 5x :20                        Ther Activity                                       Gait Training                                       Modalities

## 2022-04-06 ENCOUNTER — OFFICE VISIT (OUTPATIENT)
Dept: PHYSICAL THERAPY | Facility: CLINIC | Age: 82
End: 2022-04-06
Payer: COMMERCIAL

## 2022-04-06 DIAGNOSIS — M25.559 HIP PAIN: Primary | ICD-10-CM

## 2022-04-06 PROCEDURE — 97110 THERAPEUTIC EXERCISES: CPT

## 2022-04-06 PROCEDURE — 97140 MANUAL THERAPY 1/> REGIONS: CPT | Performed by: PHYSICAL THERAPIST

## 2022-04-06 PROCEDURE — 97110 THERAPEUTIC EXERCISES: CPT | Performed by: PHYSICAL THERAPIST

## 2022-04-06 NOTE — PROGRESS NOTES
Daily Note     Today's date: 2022  Patient name: Lovely Jacob  : 1940  MRN: [id number]  Referring provider: Rachel Wright DO  Dx: No diagnosis found  Subjective: Patient states that she was sore this morning from HEP, PT and push mowing her lawn  Patient continues to experience the most discomfort when ambulating stairs  Objective: See treatment diary below      Assessment: Tolerated treatment well  Patient exhibited good technique with therapeutic exercises      Plan: Continue per plan of care        Precautions: Kidney Disease      Manuals 3/31 04/05 4/6          Hip distraction nv No PT avail 8 - MW                                                 Neuro Re-Ed                          SLS  10" x10 10" x 10                                                                            Ther Ex                          Bike- towel or pillow behind back  6 min  6 min           Glute St 5 x :20 5x :20 5 x 20"          Hip ABD st with strap 5x:20 5x :20  5 x :20          SLR  2x10  2# 2 x 10          Supine march  2x10            Standing hip flexion st 5 x :20 5x :20 5 x 20"          Bridge    3" x 20           Clamshells   RTB x 20           Ther Activity                                       Gait Training                                       Modalities

## 2022-04-12 ENCOUNTER — OFFICE VISIT (OUTPATIENT)
Dept: PHYSICAL THERAPY | Facility: CLINIC | Age: 82
End: 2022-04-12
Payer: COMMERCIAL

## 2022-04-12 DIAGNOSIS — M25.559 HIP PAIN: Primary | ICD-10-CM

## 2022-04-12 PROCEDURE — 97110 THERAPEUTIC EXERCISES: CPT

## 2022-04-12 PROCEDURE — 97140 MANUAL THERAPY 1/> REGIONS: CPT

## 2022-04-12 NOTE — PROGRESS NOTES
Daily Note     Today's date: 2022  Patient name: Kenna Grider  : 1940  MRN: [id number]  Referring provider: Manuela Parker DO  Dx:   Encounter Diagnosis     ICD-10-CM    1  Hip pain  M25 559                   Subjective: Patient reports that she continues to have pain along add especially with ascending stairs  Objective: See treatment diary below      Assessment: Tolerated treatment well  Patient would benefit from continued PT      Plan: Continue per plan of care        Precautions: Kidney Disease      Manuals 3/31 04/05 4/6 4/12         RIGHT Hip distraction nv No PT avail 8 - MW HA                                                Neuro Re-Ed                          SLS  10" x10 10" x 10  10"x10                                                                          Ther Ex                          Bike- towel or pillow behind back  6 min  6 min  6 min         Glute St 5 x :20 5x :20 5 x 20" 5x20"         Hip ABD st with strap 5x:20 5x :20  5 x :20 manual (HA)         SLR  2x10  2# 2 x 10 2# 2x10         Supine march  2x10            Standing hip flexion st 5 x :20 5x :20 5 x 20" 5x20"         Bridge    3" x 20  3"x20         Clamshells   RTB x 20  RTB         Ther Activity                                       Gait Training                                       Modalities

## 2022-04-13 ENCOUNTER — OFFICE VISIT (OUTPATIENT)
Dept: PHYSICAL THERAPY | Facility: CLINIC | Age: 82
End: 2022-04-13
Payer: COMMERCIAL

## 2022-04-13 DIAGNOSIS — M25.559 HIP PAIN: Primary | ICD-10-CM

## 2022-04-13 PROCEDURE — 97110 THERAPEUTIC EXERCISES: CPT

## 2022-04-13 PROCEDURE — 97140 MANUAL THERAPY 1/> REGIONS: CPT

## 2022-04-13 NOTE — PROGRESS NOTES
Daily Note     Today's date: 2022  Patient name: Leveda Seip  : 1940  MRN: [id number]  Referring provider: Sybil Dickinson DO  Dx: No diagnosis found  Subjective: Moderate tenderness at distal adductors  Objective: See treatment diary below      Assessment: Tolerated treatment well  Patient exhibited good technique with therapeutic exercises      Plan: Continue per plan of care        Precautions: Kidney Disease      Manuals 3/31 04/05 4/6 4/12 4/13        RIGHT Hip distraction nv No PT avail 8 - MW HA MW        IASTM     distal adductor 5 min - MW                                  Neuro Re-Ed                          SLS  10" x10 10" x 10  10"x10 10"x 10                                                                         Ther Ex             Standing marches      1 5# slow - x 15         Bike- towel or pillow behind back  6 min  6 min  6 min 6 min        Glute St 5 x :20 5x :20 5 x 20" 5x20"         Hip ABD st with strap 5x:20 5x :20  5 x :20 manual (HA)         SLR  2x10  2# 2 x 10 2# 2x10 2# x 20         SLR abduction     2# x 20        Supine march  2x10    2# x 20         Standing hip flexion st 5 x :20 5x :20 5 x 20" 5x20" 5 x 20"         Bridge    3" x 20  3"x20 3" x 20         Clamshells   RTB x 20  RTB  RTB x 20        Side st     RTB 4 laps                                  Ther Activity                                       Gait Training                                       Modalities

## 2022-04-19 ENCOUNTER — OFFICE VISIT (OUTPATIENT)
Dept: PHYSICAL THERAPY | Facility: CLINIC | Age: 82
End: 2022-04-19
Payer: COMMERCIAL

## 2022-04-19 DIAGNOSIS — M25.559 HIP PAIN: Primary | ICD-10-CM

## 2022-04-19 PROCEDURE — 97140 MANUAL THERAPY 1/> REGIONS: CPT | Performed by: PHYSICAL THERAPIST

## 2022-04-19 PROCEDURE — 97110 THERAPEUTIC EXERCISES: CPT | Performed by: PHYSICAL THERAPIST

## 2022-04-19 NOTE — PROGRESS NOTES
Daily Note     Today's date: 2022  Patient name: Edilma Carlos  : 1940  MRN: [id number]  Referring provider: Wade Valencia DO  Dx:   Encounter Diagnosis     ICD-10-CM    1  Hip pain  M25 559                   Subjective: Patient reports that she was sore from treatment LV  Objective: See treatment diary below      Assessment: Tolerated treatment well  Patient would benefit from continued PT      Plan: Continue per plan of care        Precautions: Kidney Disease      Manuals 3/31 04/05 4/6 4/12 4/13 4/19       RIGHT Hip distraction nv No PT avail 8 - MW HA MW        IASTM     distal adductor 5 min - MW 6'       (-) pressure iastm      5'                    Neuro Re-Ed                          SLS  10" x10 10" x 10  10"x10 10"x 10 10 x :10                                                                        Ther Ex             Standing marches      1 5# slow - x 15  1 5#        Bike- towel or pillow behind back  6 min  6 min  6 min 6 min 6'       Glute St 5 x :20 5x :20 5 x 20" 5x20"         Hip ABD st with strap 5x:20 5x :20  5 x :20 manual (HA)  With (-) pressure       SLR  2x10  2# 2 x 10 2# 2x10 2# x 20  2# x 20       SLR abduction     2# x 20 2# x 20       Supine march  2x10    2# x 20  2# x 20       Standing hip flexion st 5 x :20 5x :20 5 x 20" 5x20" 5 x 20"  5 x :20       Bridge    3" x 20  3"x20 3" x 20  20 x :03       Clamshells   RTB x 20  RTB  RTB x 20 RTB x 20       Side st     RTB 4 laps RTB x 4 laps       Hip ADD isometric      20 x :05                    Ther Activity                                       Gait Training                                       Modalities

## 2022-04-21 ENCOUNTER — OFFICE VISIT (OUTPATIENT)
Dept: PHYSICAL THERAPY | Facility: CLINIC | Age: 82
End: 2022-04-21
Payer: COMMERCIAL

## 2022-04-21 DIAGNOSIS — M25.559 HIP PAIN: Primary | ICD-10-CM

## 2022-04-21 PROCEDURE — 97110 THERAPEUTIC EXERCISES: CPT

## 2022-04-21 PROCEDURE — 97140 MANUAL THERAPY 1/> REGIONS: CPT

## 2022-04-21 NOTE — PROGRESS NOTES
Daily Note     Today's date: 2022  Patient name: Kyle Martinez  : 1940  MRN: [id number]  Referring provider: Phylicia Kidd DO  Dx:   Encounter Diagnosis     ICD-10-CM    1  Hip pain  M25 559                   Subjective: patient denies a change in status since last treatment session  Objective: See treatment diary below      Assessment: Tolerated treatment well  Patient exhibited good technique with therapeutic exercises      Plan: Continue per plan of care        Precautions: Kidney Disease      Manuals 3/31 04/05 4/6 4/12 4/13 4/19 4/21      RIGHT Hip distraction nv No PT avail 8 - MW HA MW        IASTM     distal adductor 5 min - MW 6' 6      (-) pressure iastm      5' 5                   Neuro Re-Ed                          SLS  10" x10 10" x 10  10"x10 10"x 10 10 x :10 10" x 10                                                                        Ther Ex             Standing marches      1 5# slow - x 15  1 5#  2# x 15       Bike- towel or pillow behind back  6 min  6 min  6 min 6 min 6' 7       Glute St 5 x :20 5x :20 5 x 20" 5x20"         Hip ABD st with strap 5x:20 5x :20  5 x :20 manual (HA)  With (-) pressure       SLR  2x10  2# 2 x 10 2# 2x10 2# x 20  2# x 20 2# x 20       SLR abduction     2# x 20 2# x 20 2# x 20       Supine march  2x10    2# x 20  2# x 20 2# x 20       Standing hip flexion st 5 x :20 5x :20 5 x 20" 5x20" 5 x 20"  5 x :20 5 x 20'      Bridge    3" x 20  3"x20 3" x 20  20 x :03 3      Clamshells   RTB x 20  RTB  RTB x 20 RTB x 20 RTB x 20      Side st     RTB 4 laps RTB x 4 laps       Hip ADD isometric      20 x :05                    Ther Activity                                       Gait Training                                       Modalities

## 2022-04-22 ENCOUNTER — APPOINTMENT (OUTPATIENT)
Dept: LAB | Facility: CLINIC | Age: 82
End: 2022-04-22
Payer: COMMERCIAL

## 2022-04-22 DIAGNOSIS — N18.31 STAGE 3A CHRONIC KIDNEY DISEASE (HCC): ICD-10-CM

## 2022-04-22 DIAGNOSIS — N18.30 BENIGN HYPERTENSION WITH CKD (CHRONIC KIDNEY DISEASE) STAGE III (HCC): ICD-10-CM

## 2022-04-22 DIAGNOSIS — I12.9 BENIGN HYPERTENSION WITH CKD (CHRONIC KIDNEY DISEASE) STAGE III (HCC): ICD-10-CM

## 2022-04-22 LAB
ANION GAP SERPL CALCULATED.3IONS-SCNC: 10 MMOL/L (ref 4–13)
BUN SERPL-MCNC: 18 MG/DL (ref 5–25)
CALCIUM SERPL-MCNC: 9.3 MG/DL (ref 8.3–10.1)
CHLORIDE SERPL-SCNC: 105 MMOL/L (ref 100–108)
CO2 SERPL-SCNC: 29 MMOL/L (ref 21–32)
CREAT SERPL-MCNC: 1 MG/DL (ref 0.6–1.3)
CREAT UR-MCNC: 48.3 MG/DL
ERYTHROCYTE [DISTWIDTH] IN BLOOD BY AUTOMATED COUNT: 12.2 % (ref 11.6–15.1)
GFR SERPL CREATININE-BSD FRML MDRD: 52 ML/MIN/1.73SQ M
GLUCOSE P FAST SERPL-MCNC: 114 MG/DL (ref 65–99)
HCT VFR BLD AUTO: 46 % (ref 34.8–46.1)
HGB BLD-MCNC: 14.4 G/DL (ref 11.5–15.4)
MCH RBC QN AUTO: 31.4 PG (ref 26.8–34.3)
MCHC RBC AUTO-ENTMCNC: 31.3 G/DL (ref 31.4–37.4)
MCV RBC AUTO: 100 FL (ref 82–98)
MICROALBUMIN UR-MCNC: <5 MG/L (ref 0–20)
MICROALBUMIN/CREAT 24H UR: <10 MG/G CREATININE (ref 0–30)
PLATELET # BLD AUTO: 198 THOUSANDS/UL (ref 149–390)
PMV BLD AUTO: 11.5 FL (ref 8.9–12.7)
POTASSIUM SERPL-SCNC: 4.2 MMOL/L (ref 3.5–5.3)
RBC # BLD AUTO: 4.58 MILLION/UL (ref 3.81–5.12)
SODIUM SERPL-SCNC: 144 MMOL/L (ref 136–145)
WBC # BLD AUTO: 8.84 THOUSAND/UL (ref 4.31–10.16)

## 2022-04-22 PROCEDURE — 80048 BASIC METABOLIC PNL TOTAL CA: CPT

## 2022-04-22 PROCEDURE — 82570 ASSAY OF URINE CREATININE: CPT

## 2022-04-22 PROCEDURE — 82043 UR ALBUMIN QUANTITATIVE: CPT

## 2022-04-22 PROCEDURE — 36415 COLL VENOUS BLD VENIPUNCTURE: CPT

## 2022-04-22 PROCEDURE — 85027 COMPLETE CBC AUTOMATED: CPT

## 2022-05-03 ENCOUNTER — OFFICE VISIT (OUTPATIENT)
Dept: PHYSICAL THERAPY | Facility: CLINIC | Age: 82
End: 2022-05-03
Payer: COMMERCIAL

## 2022-05-03 DIAGNOSIS — M25.559 HIP PAIN: Primary | ICD-10-CM

## 2022-05-03 PROCEDURE — 97110 THERAPEUTIC EXERCISES: CPT

## 2022-05-03 NOTE — PROGRESS NOTES
Daily Note     Today's date: 5/3/2022  Patient name: Doris Davey  : 1940  MRN: [id number]  Referring provider: Walter Stoner DO  Dx: No diagnosis found  Subjective: Patient states that she has noticed an improvement with her pain/tolerance to activity and feels ready to DC to HEP  Objective: See treatment diary below      Assessment: Tolerated treatment well  Patient exhibited good technique with therapeutic exercises  Updated HEP          Plan: DC to HEP     Precautions: Kidney Disease      Manuals 3/31 04/05 4 5/3     RIGHT Hip distraction nv No PT avail 8 - MW HA MW        IASTM     distal adductor 5 min - MW 6' 6      (-) pressure iastm      5' 5                   Neuro Re-Ed                          SLS  10" x10 10" x 10  10"x10 10"x 10 10 x :10 10" x 10                                                                        Ther Ex             Standing marches      1 5# slow - x 15  1 5#  2# x 15       Bike- towel or pillow behind back  6 min  6 min  6 min 6 min 6' 7  7 min      Glute St 5 x :20 5x :20 5 x 20" 5x20"         Hip ABD st with strap 5x:20 5x :20  5 x :20 manual (HA)  With (-) pressure       SLR  2x10  2# 2 x 10 2# 2x10 2# x 20  2# x 20 2# x 20  2# x 20      SLR abduction     2# x 20 2# x 20 2# x 20  2# x 20     Supine march  2x10    2# x 20  2# x 20 2# x 20       Standing hip flexion st 5 x :20 5x :20 5 x 20" 5x20" 5 x 20"  5 x :20 5 x 20'      Bridge    3" x 20  3"x20 3" x 20  20 x :03 3 3" x 20      Clamshells   RTB x 20  RTB  RTB x 20 RTB x 20 RTB x 20 RTB x 20      Side st     RTB 4 laps RTB x 4 laps       Hip ADD isometric      20 x :05                    Ther Activity                                       Gait Training                                       Modalities

## 2022-05-05 ENCOUNTER — OFFICE VISIT (OUTPATIENT)
Dept: NEPHROLOGY | Facility: CLINIC | Age: 82
End: 2022-05-05
Payer: COMMERCIAL

## 2022-05-05 VITALS
WEIGHT: 112 LBS | BODY MASS INDEX: 21.14 KG/M2 | DIASTOLIC BLOOD PRESSURE: 76 MMHG | HEIGHT: 61 IN | SYSTOLIC BLOOD PRESSURE: 140 MMHG

## 2022-05-05 DIAGNOSIS — N28.1 RENAL CYST, ACQUIRED: ICD-10-CM

## 2022-05-05 DIAGNOSIS — N18.31 STAGE 3A CHRONIC KIDNEY DISEASE (HCC): ICD-10-CM

## 2022-05-05 DIAGNOSIS — I12.9 BENIGN HYPERTENSION WITH CKD (CHRONIC KIDNEY DISEASE) STAGE III (HCC): Primary | ICD-10-CM

## 2022-05-05 DIAGNOSIS — N18.30 BENIGN HYPERTENSION WITH CKD (CHRONIC KIDNEY DISEASE) STAGE III (HCC): Primary | ICD-10-CM

## 2022-05-05 PROCEDURE — 99214 OFFICE O/P EST MOD 30 MIN: CPT | Performed by: INTERNAL MEDICINE

## 2022-05-05 NOTE — PROGRESS NOTES
NEPHROLOGY OUTPATIENT PROGRESS NOTE   Daksha Smith 80 y o  female MRN: [id number]  DATE: 5/5/2022  Reason for visit:   Chief Complaint   Patient presents with    Follow-up    Chronic Kidney Disease     ASSESSMENT and PLAN:  CKD stage 3, baseline creatinine 0 9 to 1 1  -last serum creatinine 1 0 in April 2022   -CKD likely secondary to long-term hypertension  -UA in July 2020 bland without hematuria or proteinuria  Urine microalbumin/creatinine ratio non significant in April 2022    -repeat urine microalbumin/creatinine ratio, BMP before next visit  - avoid nephrotoxins or NSAIDs  - renal ultrasound  In March 2022 shows mild bilateral renal atrophy with right kidney 8 6 cm, left kidney 9 3 cm, normal echogenicity, no hydronephrosis  Renal cysts as below       Hypertension  -blood pressure slightly improved on repeat check in the office today  Home BP readings are acceptable 120s to low 140s/70s  -current regimen includes Benicar 10 mg daily, metoprolol 50 mg daily (mainly for palpitations)  -home BP machine was compared with our office readings previously which were fairly identical       Bilateral renal cyst  -  Repeat renal ultrasound in March 2022 shows simple bilateral renal cyst, no suspicious interval changes comparing to prior ultrasound  Diagnoses and all orders for this visit:    Benign hypertension with CKD (chronic kidney disease) stage III ([address] )  -     Basic metabolic panel; Future  -     Microalbumin / creatinine urine ratio; Future    Stage 3a chronic kidney disease (HCC)  -     Basic metabolic panel; Future  -     Microalbumin / creatinine urine ratio;  Future    Renal cyst, acquired          SUBJECTIVE / HPI:  [facility] medical issues of hypertension for more than 16 years, degenerative joint disease, arthritis, who presents for regular follow-up for renal failure, hypertension and renal cyst  Patient's baseline serum creatinine seems to be in the range of 0 9 to 1 1 going back to 2014  Last serum creatinine stable at baseline  Home BP readings are better controlled as mentioned above  She will be going to [facility] in July 2022  Denies any symptoms  She denies any urinary complaint including no dysuria, no hematuria, no urgency or hesitancy  Denies any shortness of breath or chest pain  No significant NSAID exposure  REVIEW OF SYSTEMS:  More than 10 point review of systems were obtained and discussed in length with the patient  Complete review of systems were negative / unremarkable except mentioned above  PHYSICAL EXAM:  Vitals:    05/05/22 1139 05/05/22 1221   BP: 148/82 140/76   BP Location: Left arm    Patient Position: Sitting    Cuff Size: Adult    Weight: 50 8 kg (112 lb)    Height: 5' 1" (1 549 m)      Body mass index is 21 16 kg/m²  Physical Exam  Vitals reviewed  Constitutional:       Appearance: She is well-developed  HENT:      Head: Normocephalic and atraumatic  Right Ear: External ear normal       Left Ear: External ear normal    Eyes:      Conjunctiva/sclera: Conjunctivae normal    Cardiovascular:      Comments: S1, S2 present  Pulmonary:      Effort: Pulmonary effort is normal       Breath sounds: Normal breath sounds  No wheezing or rales  Abdominal:      General: Bowel sounds are normal  There is no distension  Palpations: Abdomen is soft  Tenderness: There is no abdominal tenderness  Musculoskeletal:         General: No deformity  Right lower leg: No edema  Left lower leg: No edema  Lymphadenopathy:      Cervical: No cervical adenopathy  Skin:     Findings: No rash  Neurological:      Mental Status: She is alert and oriented to person, place, and time     Psychiatric:         Behavior: Behavior normal          PAST MEDICAL HISTORY:  Past Medical History:   Diagnosis Date    Hypertension        PAST SURGICAL HISTORY:  Past Surgical History:   Procedure Laterality Date    APPENDECTOMY      CERVICAL SPINE SURGERY         SOCIAL HISTORY:  Social History     Substance and Sexual Activity   Alcohol Use Yes    Alcohol/week: 10 0 standard drinks    Types: 10 Glasses of wine per week     Social History     Substance and Sexual Activity   Drug Use No     Social History     Tobacco Use   Smoking Status Former Smoker   Smokeless Tobacco Never Used       FAMILY HISTORY:  Family History   Problem Relation Age of Onset    Cancer Mother     Hypertension Sister     Heart disease Brother        MEDICATIONS:    Current Outpatient Medications:     Ascorbic Acid (VITAMIN C) 1000 MG tablet, Take 1 tablet by mouth daily, Disp: , Rfl:     metoprolol succinate (TOPROL-XL) 50 mg 24 hr tablet, Take 50 mg by mouth daily, Disp: , Rfl:     olmesartan (BENICAR) 5 mg tablet, Take 2 tablets (10 mg total) by mouth daily, Disp: 180 tablet, Rfl: 3    Lab Results:   Results for orders placed or performed in visit on 90/80/56   Basic metabolic panel   Result Value Ref Range    Sodium 144 136 - 145 mmol/L    Potassium 4 2 3 5 - 5 3 mmol/L    Chloride 105 100 - 108 mmol/L    CO2 29 21 - 32 mmol/L    ANION GAP 10 4 - 13 mmol/L    BUN 18 5 - 25 mg/dL    Creatinine 1 00 0 60 - 1 30 mg/dL    Glucose, Fasting 114 (H) 65 - 99 mg/dL    Calcium 9 3 8 3 - 10 1 mg/dL    eGFR 52 ml/min/1 73sq m   CBC   Result Value Ref Range    WBC 8 84 4 31 - 10 16 Thousand/uL    RBC 4 58 3 81 - 5 12 Million/uL    Hemoglobin 14 4 11 5 - 15 4 g/dL    Hematocrit 46 0 34 8 - 46 1 %     (H) 82 - 98 fL    MCH 31 4 26 8 - 34 3 pg    MCHC 31 3 (L) 31 4 - 37 4 g/dL    RDW 12 2 11 6 - 15 1 %    Platelets 377 472 - 199 Thousands/uL    MPV 11 5 8 9 - 12 7 fL   Microalbumin / creatinine urine ratio   Result Value Ref Range    Creatinine, Ur 48 3 mg/dL    Microalbum  ,U,Random <5 0 0 0 - 20 0 mg/L    Microalb Creat Ratio <10 0 - 30 mg/g creatinine

## 2022-05-18 NOTE — PROGRESS NOTES
DISCHARGE    Cristela attended physical therapy for hip/LE pain  She made excellent progress in reducing her sx and is DC at this time to exercise on her own    Thank you for your referral

## 2022-05-26 ENCOUNTER — TELEPHONE (OUTPATIENT)
Dept: NEPHROLOGY | Facility: CLINIC | Age: 82
End: 2022-05-26

## 2022-05-26 NOTE — TELEPHONE ENCOUNTER
Pt called the office wanting to let [facility] know she paid her $20 co-pay on 05/20/22 she would like for payment notifications to be made known upon arrival to the office  I verbalized understanding

## 2022-06-13 DIAGNOSIS — N18.30 BENIGN HYPERTENSION WITH CKD (CHRONIC KIDNEY DISEASE) STAGE III (HCC): ICD-10-CM

## 2022-06-13 DIAGNOSIS — I12.9 BENIGN HYPERTENSION WITH CKD (CHRONIC KIDNEY DISEASE) STAGE III (HCC): ICD-10-CM

## 2022-06-14 RX ORDER — OLMESARTAN MEDOXOMIL 5 MG/1
TABLET ORAL
Qty: 180 TABLET | Refills: 3 | Status: SHIPPED | OUTPATIENT
Start: 2022-06-14

## 2022-06-25 ENCOUNTER — APPOINTMENT (OUTPATIENT)
Dept: LAB | Facility: CLINIC | Age: 82
End: 2022-06-25
Payer: COMMERCIAL

## 2022-06-25 DIAGNOSIS — R79.89 HYPOURICEMIA: ICD-10-CM

## 2022-06-25 LAB
ANION GAP SERPL CALCULATED.3IONS-SCNC: 9 MMOL/L (ref 4–13)
BUN SERPL-MCNC: 23 MG/DL (ref 5–25)
CALCIUM SERPL-MCNC: 9.8 MG/DL (ref 8.4–10.2)
CHLORIDE SERPL-SCNC: 101 MMOL/L (ref 96–108)
CO2 SERPL-SCNC: 27 MMOL/L (ref 21–32)
CREAT SERPL-MCNC: 0.83 MG/DL (ref 0.6–1.3)
GFR SERPL CREATININE-BSD FRML MDRD: 66 ML/MIN/1.73SQ M
GLUCOSE SERPL-MCNC: 101 MG/DL (ref 65–140)
POTASSIUM SERPL-SCNC: 4.5 MMOL/L (ref 3.5–5.3)
SODIUM SERPL-SCNC: 137 MMOL/L (ref 135–147)

## 2022-06-25 PROCEDURE — 80048 BASIC METABOLIC PNL TOTAL CA: CPT

## 2022-06-25 PROCEDURE — 36415 COLL VENOUS BLD VENIPUNCTURE: CPT

## 2022-06-26 ENCOUNTER — HOSPITAL ENCOUNTER (OUTPATIENT)
Dept: VASCULAR ULTRASOUND | Facility: HOSPITAL | Age: 82
Discharge: HOME | End: 2022-06-26
Payer: COMMERCIAL

## 2022-06-26 DIAGNOSIS — M79.661 PAIN IN RIGHT LOWER LEG: ICD-10-CM

## 2022-06-26 PROCEDURE — 93971 EXTREMITY STUDY: CPT

## 2022-06-27 PROCEDURE — 93971 EXTREMITY STUDY: CPT | Performed by: SURGERY

## 2022-07-05 ENCOUNTER — TELEPHONE (OUTPATIENT)
Dept: VASCULAR SURGERY | Facility: CLINIC | Age: 82
End: 2022-07-05

## 2022-07-05 ENCOUNTER — OFFICE VISIT (OUTPATIENT)
Dept: VASCULAR SURGERY | Facility: CLINIC | Age: 82
End: 2022-07-05
Payer: COMMERCIAL

## 2022-07-05 VITALS
DIASTOLIC BLOOD PRESSURE: 80 MMHG | SYSTOLIC BLOOD PRESSURE: 138 MMHG | WEIGHT: 112 LBS | HEIGHT: 61 IN | HEART RATE: 67 BPM | BODY MASS INDEX: 21.14 KG/M2

## 2022-07-05 DIAGNOSIS — I73.9 PAD (PERIPHERAL ARTERY DISEASE) (HCC): ICD-10-CM

## 2022-07-05 DIAGNOSIS — I73.9 CLAUDICATION OF RIGHT LOWER EXTREMITY (HCC): Primary | ICD-10-CM

## 2022-07-05 PROCEDURE — 99203 OFFICE O/P NEW LOW 30 MIN: CPT | Performed by: NURSE PRACTITIONER

## 2022-07-05 RX ORDER — AZELASTINE HYDROCHLORIDE 0.5 MG/ML
SOLUTION/ DROPS OPHTHALMIC
COMMUNITY
Start: 2022-06-25

## 2022-07-05 RX ORDER — TOBRAMYCIN 3 MG/ML
SOLUTION/ DROPS OPHTHALMIC
COMMUNITY
Start: 2022-05-31

## 2022-07-05 NOTE — TELEPHONE ENCOUNTER
Patient is scheduled to see Misa Rodriguez today, 7/5/22   Called and informed Satinder Montes at Dr Lashawn Torre office

## 2022-07-05 NOTE — ASSESSMENT & PLAN NOTE
80-year-old active female with HTN and CKD presents with complaints of RLE calf claudication after walking 1/4 mile and to review incidental SFA stenosis finding found on recent LEV  - patient complains of RLE calf claudication after walking 1/4 mi that resolves after a few minutes of rest   Patient was avid runner, now walking about 1 5- 3 miles a day  - patient denies rest pain, no wounds  - patient presented to PCP with complaints of RLE calf pain  LEV obtained  - LEV 6/26/22  No evidence of acute or chronic deep vein thrombosis   No evidence of superficial thrombophlebitis noted  Doppler evaluation shows a normal response to augmentation maneuvers  Popliteal, posterior tibial and anterior tibial arterial Doppler waveforms are monophasic  INCIDENTAL FINDING: There is a mid SFA occlusion vs high grade stenosis flow distal to this is monophasic  - R DP Doppler signal, L palpable DP/PT    Plan:  - non life-limiting calf claudication   - lower extremity arterial duplex  - continue daily ASA 81 mg  - recommend statin for optimal medical management    Discuss with PCP  - dedicated walking program  - return to office in 3 months for follow-up  - will call with results of lad and further recommendations if indicated based on findings  - call or return to office sooner with new or worsening symptoms

## 2022-07-05 NOTE — PATIENT INSTRUCTIONS
Lower extremity arterial duplex  Continue daily aspirin 81 mg  Dedicated walking program  Consider statin  Discuss with PCP  Return to office in 3 months    Peripheral Artery Disease   AMBULATORY CARE:   Peripheral artery disease (PAD)  is narrow, weak, or blocked arteries  It may affect any arteries outside of your heart and brain  PAD is usually the result of a buildup of fat and cholesterol, also called plaque, along your artery walls  Inflammation, a blood clot, or abnormal cell growth could also block your arteries  PAD prevents normal blood flow to your legs and arms  You are at risk of an amputation if poor blood flow keeps wounds from healing or causes gangrene (tissue death)  Without treatment, PAD can also cause a heart attack or stroke  Common symptoms include:  Mild PAD usually does not cause symptoms  As the disease worsens over time, you may have the following:  Pain or cramps in your leg or hip while you walk    A numb, weak, or heavy feeling in your legs    Dry, scaly, red, or pale skin on your legs    Thick or brittle nails, or hair loss on your arms and legs    Foot sores that will not heal    Burning or aching in your feet and toes while resting (this may be worse when you lie down)    Call your local emergency number (911 in the [address] [place] [address],[address]) if:   You have any of the following signs of a heart attack:      Squeezing, pressure, or pain in your chest    You may  also have any of the following:     Discomfort or pain in your back, neck, jaw, stomach, or arm    Shortness of breath    Nausea or vomiting    Lightheadedness or a sudden cold sweat    You have any of the following signs of a stroke:      Numbness or drooping on one side of your face     Weakness in an arm or leg    Confusion or difficulty speaking    Dizziness, a severe headache, or vision loss    Seek care immediately if:   You have sores or wounds that will not heal     You notice black or discolored skin on your arm or leg      Your skin is cool to the touch  Call your doctor if:   You have leg pain when you walk 1/8 mile (200 meters) or less, even with treatment  Your legs are red, dry, or pale, even with treatment  You have questions or concerns about your condition or care  Treatment for PAD  can help reduce your risk of a heart attack, stroke, or amputation  You may need more than one of the following:  Medicines  may be given to prevent blood clots and reduce the risk of a heart attack or stroke  You may be given medicine to help prevent your PAD from getting worse  A supervised exercise program  helps you stay active in normal daily activities  Healthcare providers will help you safely walk or do strength training exercises 3 times a week for 30 to 60 minutes  You will do this for several months, then transition to walking on your own  Angioplasty  is a procedure to open your artery so blood can flow through normally  A thin tube called a catheter is used to insert a small balloon into your artery  The balloon is inflated to open your blocked artery, and then removed  A tube called a stent may be placed in your artery to hold it open  Bypass surgery  is used to make a new connection to your artery with a vein from another part of your body, or an artificial graft  The vein or graft is attached to your artery above and below your blockage  This allows blood to flow around the blocked portion of your artery  Manage and prevent PAD:   Walk for 30 to 60 minutes at least 4 times a week  Your healthcare provider may also refer you to a supervised exercise program  The program helps increase how far you can walk without pain  It also helps you stay active in normal daily activities         Do not smoke  Nicotine and other chemicals in cigarettes and cigars can worsen PAD  They can also increase your risk for a heart attack or stroke  Ask your healthcare provider for information if you currently smoke and need help to quit  E-cigarettes or smokeless tobacco still contain nicotine  Talk to your healthcare provider before you use these products  Manage other health conditions  Take your medicines as directed and follow your healthcare provider's instructions if you have high blood pressure or high cholesterol  Perform foot care and check your blood sugar levels as directed if you have diabetes  Eat heart-healthy foods  Eat whole grains, fruits, and vegetables every day  Limit salt and high-fat foods  Ask your healthcare provider for more information on a heart healthy diet  Ask what a healthy weight is for you  Your healthcare provider can help you create a healthy weight-loss plan, if needed  Follow up with your doctor as directed:  Write down your questions so you remember to ask them during your visits  © Copyright [employer] 2022 Information is for End User's use only and may not be sold, redistributed or otherwise used for commercial purposes  All illustrations and images included in CareNotes® are the copyrighted property of A D A M , Inc  or [facility] Monique Caballero   The above information is an  only  It is not intended as medical advice for individual conditions or treatments  Talk to your doctor, nurse or pharmacist before following any medical regimen to see if it is safe and effective for you

## 2022-07-05 NOTE — PROGRESS NOTES
Assessment/Plan:    Claudication of right lower extremity [facility])  80-year-old active female with HTN and CKD presents with complaints of RLE calf claudication after walking 1/4 mile and to review incidental SFA stenosis finding found on recent LEV  - patient complains of RLE calf claudication after walking 1/4 mi that resolves after a few minutes of rest   Patient was avid runner, now walking about 1 5- 3 miles a day  - patient denies rest pain, no wounds  - patient presented to PCP with complaints of RLE calf pain  LEV obtained  - LEV 6/26/22  No evidence of acute or chronic deep vein thrombosis   No evidence of superficial thrombophlebitis noted  Doppler evaluation shows a normal response to augmentation maneuvers  Popliteal, posterior tibial and anterior tibial arterial Doppler waveforms are monophasic  INCIDENTAL FINDING: There is a mid SFA occlusion vs high grade stenosis flow distal to this is monophasic  - R DP Doppler signal, L palpable DP/PT    Plan:  - non life-limiting calf claudication   - lower extremity arterial duplex  - continue daily ASA 81 mg  - recommend statin for optimal medical management    Discuss with PCP  - dedicated walking program  - return to office in 3 months for follow-up  - will call with results of lad and further recommendations if indicated based on findings  - call or return to office sooner with new or worsening symptoms    Benign hypertension with CKD (chronic kidney disease) stage III ([facility])  -BP well controlled  -continue current medical regimen  -management per PCP         Diagnoses and all orders for this visit:    Claudication of right lower extremity ([address] )  -     VAS lower limb arterial duplex, complete bilateral; Future    PAD (peripheral artery disease) ([facility])  -     VAS lower limb arterial duplex, complete bilateral; Future    Other orders  -     azelastine (OPTIVAR) 0 05 % ophthalmic solution  -     tobramycin (TOBREX) 0 3 % SOLN          Subjective: Patient ID: Martínez Montejo is a 80 y o  female  Patient is new to our practice referred by Los Torres MD     HPI  See assessment and plan  Eleazar Lockett is an 58-year-old female who presents with complaints of RLE calf claudication after walking 1/4 mi that resolves with rest   Patient is very active and was avid runner, now walking 1-1/2-3 miles per day  Patient began to notice increased calf cramping after 1/4 mi  She presented to PCP who ordered venous duplex  Venous duplex without acute DVT or SVT however demonstrated incidental finding of SFA high-grade stenosis versus occlusion  We discussed pathophysiology of peripheral arterial disease  At this time patient does not describe life-limiting claudication  She denies rest pain or tissue loss  We discussed plan of obtaining official dedicated arterial duplex w/ HUMPHREY, continuing ASA and initiating statin therapy monitored by PCP  In addition recommend dedicated walking program   Patient has trip to [place] planned at the end of this month  We will obtain imaging prior to trip however given that her claudication did not seem severe, okay to continue with trip and can follow-up in 3 months  Advised patient to return to office sooner with worsening calf claudication, rest pain or tissue loss  Patient verbalized understanding, all questions were answered  The following portions of the patient's history were reviewed and updated as appropriate: allergies, current medications, past family history, past medical history, past social history, past surgical history and problem list       Review of Systems   Constitutional: Negative  HENT: Negative  Eyes: Negative  Respiratory: Negative  Cardiovascular: Negative  Gastrointestinal: Negative  Endocrine: Negative  Genitourinary: Negative  Musculoskeletal: Negative  Skin: Negative  Allergic/Immunologic: Negative  Neurological: Negative  Hematological: Negative  Psychiatric/Behavioral: Negative  Objective:      /80 (BP Location: Left arm, Patient Position: Sitting, Cuff Size: Adult)   Pulse 67   Ht 5' 1" (1 549 m)   Wt 50 8 kg (112 lb)   BMI 21 16 kg/m²          Physical Exam  Vitals reviewed  Constitutional:       General: She is not in acute distress  Appearance: Normal appearance  She is normal weight  HENT:      Head: Normocephalic and atraumatic  Neck:      Vascular: No carotid bruit  Cardiovascular:      Rate and Rhythm: Normal rate and regular rhythm  Pulses:           Carotid pulses are 2+ on the right side and 2+ on the left side  Radial pulses are 2+ on the right side and 2+ on the left side  Femoral pulses are 2+ on the right side and 2+ on the left side  Dorsalis pedis pulses are detected w/ Doppler on the right side and 2+ on the left side  Posterior tibial pulses are 0 on the right side and 2+ on the left side  Heart sounds: Normal heart sounds  Pulmonary:      Effort: No respiratory distress  Breath sounds: Normal breath sounds  No wheezing  Abdominal:      General: Abdomen is flat  Bowel sounds are normal  There is no abdominal bruit  Palpations: Abdomen is soft  There is no mass or pulsatile mass  Tenderness: There is no abdominal tenderness  Musculoskeletal:         General: Normal range of motion  Cervical back: Normal range of motion and neck supple  Right lower leg: No edema  Left lower leg: No edema  Skin:     General: Skin is warm and dry  Capillary Refill: Capillary refill takes less than 2 seconds  Neurological:      Mental Status: She is alert and oriented to person, place, and time  Sensory: No sensory deficit  Motor: No weakness     Psychiatric:         Mood and Affect: Mood normal          Behavior: Behavior normal              Vitals:    07/05/22 1616   BP: 138/80   BP Location: Left arm   Patient Position: Sitting   Cuff Size: Adult   Pulse: 67   Weight: 50 8 kg (112 lb)   Height: 5' 1" (1 549 m)       Patient Active Problem List   Diagnosis    Benign hypertension with CKD (chronic kidney disease) stage III (HCC)    Stage 3a chronic kidney disease (HCC)    Renal cyst, acquired    Claudication of right lower extremity ([facility] [address] )       Past Surgical History:   Procedure Laterality Date    APPENDECTOMY      CERVICAL SPINE SURGERY         Family History   Problem Relation Age of Onset    Cancer Mother     Hypertension Sister     Heart disease Brother        Social History     Socioeconomic History    Marital status: Single     Spouse name: Not on file    Number of children: Not on file    Years of education: Not on file    Highest education level: Not on file   Occupational History    Not on file   Tobacco Use    Smoking status: Former Smoker    Smokeless tobacco: Never Used   Substance and Sexual Activity    Alcohol use:  Yes     Alcohol/week: 10 0 standard drinks     Types: 10 Glasses of wine per week    Drug use: No    Sexual activity: Never   Other Topics Concern    Not on file   Social History Narrative    Not on file     Social Determinants of Health     Financial Resource Strain: Not on file   Food Insecurity: Not on file   Transportation Needs: Not on file   Physical Activity: Not on file   Stress: Not on file   Social Connections: Not on file   Intimate Partner Violence: Not on file   Housing Stability: Not on file       No Known Allergies      Current Outpatient Medications:     Ascorbic Acid (VITAMIN C) 1000 MG tablet, Take 1 tablet by mouth daily, Disp: , Rfl:     azelastine (OPTIVAR) 0 05 % ophthalmic solution, , Disp: , Rfl:     metoprolol succinate (TOPROL-XL) 50 mg 24 hr tablet, Take 50 mg by mouth daily, Disp: , Rfl:     olmesartan (BENICAR) 5 mg tablet, TAKE 2 TABLETS BY MOUTH DAILY, Disp: 180 tablet, Rfl: 3    tobramycin (TOBREX) 0 3 % SOLN, , Disp: , Rfl:

## 2022-07-05 NOTE — TELEPHONE ENCOUNTER
Call transferred to surgery scheduling from Nasrin Johnston if Dr Prakash Beth office requesting [facility] office visit be moved up sooner  She is scheduled 7/19/22 with Jolanta Rodriguez as a new patient  She was put on baby aspirin, she has a trip scheduled for [facility] and the office feels she may need a stent placed  Please call patient if there is a sooner appointment  Nasrin Johnston of Dr Prakash Beth office would like a call back as well with the response  Phone: [phone]

## 2022-07-06 ENCOUNTER — TELEPHONE (OUTPATIENT)
Dept: VASCULAR SURGERY | Facility: CLINIC | Age: 82
End: 2022-07-06

## 2022-07-06 NOTE — TELEPHONE ENCOUNTER
Patient called , she saw Casi Begum NP yesterday and she had recommended a statin medication  Patient said however, she thought she did not order  I read Lianet's note and it seemed she recommended  PCP start statin  Patient is concerned because she has a trip to [facility] planned for late July and was hoping the statin would help with her claudication  She is doing a walking program daily because she wants to be able to walk on her trip  She asked if I could ask Lianet to start the statin for her  Will send to vascular triage to advise

## 2022-07-06 NOTE — TELEPHONE ENCOUNTER
Please explain to pt statin is not meant to help with claudication she is experiencing, it is used for optimal medical management of PAD  We discussed medical management with daily ASA and statin and recommended management of statin by PCP  Awaiting formal LEAD results

## 2022-07-15 ENCOUNTER — HOSPITAL ENCOUNTER (OUTPATIENT)
Dept: NON INVASIVE DIAGNOSTICS | Facility: CLINIC | Age: 82
Discharge: HOME | End: 2022-07-15
Payer: COMMERCIAL

## 2022-07-15 DIAGNOSIS — I73.9 PAD (PERIPHERAL ARTERY DISEASE) (HCC): ICD-10-CM

## 2022-07-15 DIAGNOSIS — I73.9 CLAUDICATION OF RIGHT LOWER EXTREMITY (HCC): ICD-10-CM

## 2022-07-15 PROCEDURE — 93922 UPR/L XTREMITY ART 2 LEVELS: CPT | Performed by: SURGERY

## 2022-07-15 PROCEDURE — 93925 LOWER EXTREMITY STUDY: CPT | Performed by: SURGERY

## 2022-07-15 PROCEDURE — 93923 UPR/LXTR ART STDY 3+ LVLS: CPT

## 2022-07-15 PROCEDURE — 93925 LOWER EXTREMITY STUDY: CPT

## 2022-07-18 ENCOUNTER — TELEPHONE (OUTPATIENT)
Dept: VASCULAR SURGERY | Facility: CLINIC | Age: 82
End: 2022-07-18

## 2022-07-18 NOTE — TELEPHONE ENCOUNTER
Call placed to discuss LEAD results from 7/15/22  No answer on home or cell numbers  LMOM (cell) to return call to review  Call center number given and patient advised I am in [place] office today

## 2022-07-18 NOTE — TELEPHONE ENCOUNTER
Patient returned phone call  Discussed results of lower extremity arterial duplex demonstrating left SFA high-grade stenosis vs occlusion with HUMPHREY of 0 4  Patient's symptoms unchanged since OV  Experiences claudication at approximately 1/4 mi that resolves with rest   Patient denies rest pain no tissue loss  Patient is currently on optimal medical management with ASA and added statin  Advised patient of results of stenosis and HUMPHREY in ischemic range  Given that her claudication is not life-limiting, and she does not have rest pain or tissue loss we will continue with walking program and okay to go on planned trip to [place] however advised patient she will be experiencing same claudication symptoms  Encouraged proper footwear to be conscientious of wounds to feet    Patient reports slower ambulation helps the distance she is able to go    Advised to follow-up with office when she returns can discuss option of future potential angiogram       Patient is agreeable to plan without further questions

## 2022-08-05 ENCOUNTER — TELEPHONE (OUTPATIENT)
Dept: NEPHROLOGY | Facility: CLINIC | Age: 82
End: 2022-08-05

## 2022-08-05 NOTE — TELEPHONE ENCOUNTER
Left message to schedule November follow up appointment with Dr Faheem Cornelius in [place]  This is the 1st attempt

## 2022-08-08 ENCOUNTER — TELEPHONE (OUTPATIENT)
Dept: VASCULAR SURGERY | Facility: CLINIC | Age: 82
End: 2022-08-08

## 2022-08-08 NOTE — TELEPHONE ENCOUNTER
Pt called this morning as she has returned from her trip  She stated that she wanted to discuss getting an angiogram done  Please call pt with any information in regards to this

## 2022-08-08 NOTE — TELEPHONE ENCOUNTER
andres'd phone call from patient to schedule her November fu - wanted to stay in [place] - scheduled for 11/11 with Drew Nicole

## 2022-08-08 NOTE — TELEPHONE ENCOUNTER
Patient should be set up to be seen in [facility] with vascular surgeon to discuss further and review imaging and my last office note  Please see telephone encounter 7/18/22 re: follow up      Thanks

## 2022-08-09 RX ORDER — ATORVASTATIN CALCIUM 20 MG/1
TABLET, FILM COATED ORAL
COMMUNITY
Start: 2022-07-06

## 2022-08-10 ENCOUNTER — OFFICE VISIT (OUTPATIENT)
Dept: VASCULAR SURGERY | Facility: CLINIC | Age: 82
End: 2022-08-10
Payer: COMMERCIAL

## 2022-08-10 ENCOUNTER — TELEPHONE (OUTPATIENT)
Dept: VASCULAR SURGERY | Facility: CLINIC | Age: 82
End: 2022-08-10

## 2022-08-10 VITALS
BODY MASS INDEX: 20.99 KG/M2 | HEIGHT: 61 IN | HEART RATE: 80 BPM | SYSTOLIC BLOOD PRESSURE: 124 MMHG | WEIGHT: 111.2 LBS | DIASTOLIC BLOOD PRESSURE: 80 MMHG

## 2022-08-10 DIAGNOSIS — N18.31 STAGE 3A CHRONIC KIDNEY DISEASE (HCC): ICD-10-CM

## 2022-08-10 DIAGNOSIS — I70.211 ATHEROSCLEROSIS OF NATIVE ARTERY OF RIGHT LOWER EXTREMITY WITH INTERMITTENT CLAUDICATION (HCC): Primary | ICD-10-CM

## 2022-08-10 PROCEDURE — 99215 OFFICE O/P EST HI 40 MIN: CPT | Performed by: SURGERY

## 2022-08-10 RX ORDER — ASPIRIN 81 MG/1
81 TABLET ORAL DAILY
COMMUNITY

## 2022-08-10 RX ORDER — SODIUM CHLORIDE 9 MG/ML
125 INJECTION, SOLUTION INTRAVENOUS CONTINUOUS
Status: CANCELLED | OUTPATIENT
Start: 2022-08-10

## 2022-08-10 NOTE — PROGRESS NOTES
Assessment/Plan:    Pt is an 79 yo F w/ CKD, HTN, PAD w/ RLE claudication    Atherosclerosis of native artery of right lower extremity with intermittent claudication (HCC)  -     IR abdominal aortagram; Future  -     Basic metabolic panel; Future  -     CBC and Platelet; Future  -     Protime-INR; Future  -RLE claudication, life-limiting  -reviewed LEADs which show R: 0 42/0/0 and L: 0 99/125/111 w/ R SFA occlusion  -discussed arterial occlusive disease and options for treatment; patient is already doing exercise program and her symptoms are still significantly limiting her very active lifestyle; she would like intervention and we are planning RLE angiogram; discussed risks and benefits including risk of contrast nephropathy with her mild renal disease  -plan for RLE angiogram, L femoral access  -labs    Stage 3a chronic kidney disease (HCC)  -Cr: 0 83  GFR: 66    Medications  -continue ASA and statin    Other orders  -     atorvastatin (LIPITOR) 20 mg tablet  -     aspirin (ECOTRIN LOW STRENGTH) 81 mg EC tablet; Take 81 mg by mouth daily  -     Nursing communication Apply gown prior to procedure; Standing  -     Have Patient Void On Call to Procedure Room; Standing  -     Insert and Maintain IV; Standing  -     sodium chloride 0 9 % infusion      Operative Scheduling Information:    Hospital:  Any IR/endo suite    Physician:  Me    Surgery: RLE angiogram, L femoral access    Urgency:  Standard    Level:  Level 4: Outpatients to be scheduled for screening procedures and elective surgery that can be delayed for longer than one month without reasonable expectation of detriment to patient  Case Length:  Normal    Post-op Bed:  Outpatient    OR Table:  IR    Equipment Needs:  None    Medication Instructions:  Aspirin:   Continue (do not hold)    Hydration:  On admission    Contrast Allergy:  no      Subjective:      Patient ID: Fareed Zamora is a 80 y o  female      Pt is here for 3 month f/u to discuss Angiogram procedure and to rev KATHY 7/18/22  Pt says she has R calf cramping after walking less than half a mile  Pt recently came back from [address] [place] [address] and has noticed her R foot has some tingling  Pt is taking Atorvastatin and ASA  Pt is a former smoker  HPI:    Patient returns to discuss PAD and possible intervention  Patient is having cramping and heaviness in the right calf with walking  She is able to walk >1 mile but has to stop for breaks  She was just on a trip to \[place]\"" and did lots of walking but her symptoms limited her  Denies LLE symptoms  Denies rest pain or wounds  Taking ASA and statin      The following portions of the patient's history were reviewed and updated as appropriate: allergies, current medications, past family history, past medical history, past social history, past surgical history and problem list     Review of Systems   Constitutional: Negative  HENT: Negative  Eyes: Negative  Respiratory: Negative  Cardiovascular: Negative  Negative for leg swelling  Gastrointestinal: Negative  Endocrine: Negative  Genitourinary: Negative  Musculoskeletal: Positive for gait problem  R calf claudication   Skin: Negative  Negative for wound  Allergic/Immunologic: Negative  Neurological: Positive for numbness (intermittent numbness of the R plantar arch area)  Negative for weakness  Hematological: Negative  Psychiatric/Behavioral: Negative  Objective:      /80 (BP Location: Right arm, Patient Position: Sitting, Cuff Size: Standard)   Pulse 80   Ht 5' 1" (1 549 m)   Wt 50 4 kg (111 lb 3 2 oz)   BMI 21 01 kg/m²          Physical Exam  Vitals and nursing note reviewed  Constitutional:       Appearance: She is well-developed  HENT:      Head: Normocephalic and atraumatic  Eyes:      Conjunctiva/sclera: Conjunctivae normal    Cardiovascular:      Rate and Rhythm: Normal rate and regular rhythm        Pulses:           Radial pulses are 2+ on the right side and 2+ on the left side  Femoral pulses are 2+ on the right side and 2+ on the left side  Dorsalis pedis pulses are 0 on the right side and 2+ on the left side  Posterior tibial pulses are 0 on the right side and 2+ on the left side  Heart sounds: Normal heart sounds  No murmur heard  Comments: No carotid bruits  Pulmonary:      Effort: Pulmonary effort is normal  No respiratory distress  Breath sounds: Normal breath sounds  No wheezing or rales  Abdominal:      General: There is no distension  Palpations: Abdomen is soft  Tenderness: There is no abdominal tenderness  There is no rebound  Musculoskeletal:         General: Normal range of motion  Cervical back: Normal range of motion and neck supple  Right lower leg: No edema  Left lower leg: No edema  Skin:     General: Skin is warm and dry  Comments: Very small scabbed area on the L anterior ankle with resolving ecchymosis   Neurological:      Mental Status: She is alert and oriented to person, place, and time  Psychiatric:         Behavior: Behavior normal            I have reviewed and made appropriate changes to the review of systems input by the medical assistant      Vitals:    08/10/22 1113   BP: 124/80   BP Location: Right arm   Patient Position: Sitting   Cuff Size: Standard   Pulse: 80   Weight: 50 4 kg (111 lb 3 2 oz)   Height: 5' 1" (1 549 m)       Patient Active Problem List   Diagnosis    Benign hypertension with CKD (chronic kidney disease) stage III (HCC)    Stage 3a chronic kidney disease (HCC)    Renal cyst, acquired    Atherosclerosis of native artery of right lower extremity with intermittent claudication ([facility] [address] )       Past Surgical History:   Procedure Laterality Date    APPENDECTOMY      CERVICAL SPINE SURGERY         Family History   Problem Relation Age of Onset    Cancer Mother     Hypertension Sister     Heart disease Brother        Social History Socioeconomic History    Marital status: Single     Spouse name: Not on file    Number of children: Not on file    Years of education: Not on file    Highest education level: Not on file   Occupational History    Not on file   Tobacco Use    Smoking status: Former Smoker    Smokeless tobacco: Never Used   Substance and Sexual Activity    Alcohol use:  Yes     Alcohol/week: 10 0 standard drinks     Types: 10 Glasses of wine per week    Drug use: No    Sexual activity: Never   Other Topics Concern    Not on file   Social History Narrative    Not on file     Social Determinants of Health     Financial Resource Strain: Not on file   Food Insecurity: Not on file   Transportation Needs: Not on file   Physical Activity: Not on file   Stress: Not on file   Social Connections: Not on file   Intimate Partner Violence: Not on file   Housing Stability: Not on file       No Known Allergies      Current Outpatient Medications:     Ascorbic Acid (VITAMIN C) 1000 MG tablet, Take 1 tablet by mouth daily, Disp: , Rfl:     aspirin (ECOTRIN LOW STRENGTH) 81 mg EC tablet, Take 81 mg by mouth daily, Disp: , Rfl:     atorvastatin (LIPITOR) 20 mg tablet, , Disp: , Rfl:     metoprolol succinate (TOPROL-XL) 50 mg 24 hr tablet, Take 50 mg by mouth daily, Disp: , Rfl:     olmesartan (BENICAR) 5 mg tablet, TAKE 2 TABLETS BY MOUTH DAILY, Disp: 180 tablet, Rfl: 3    azelastine (OPTIVAR) 0 05 % ophthalmic solution, , Disp: , Rfl:     tobramycin (TOBREX) 0 3 % SOLN, , Disp: , Rfl:

## 2022-08-10 NOTE — TELEPHONE ENCOUNTER
REMINDER: Under Reason For Call, comments MUST be formatted as:   (Surgeon's Initials) / (Procedure)      Special Instructions / FYI:     Procedure: RLE angiogram, L femoral access    Level: 4 - Route clearance(s) to [facility] Surgery Coordinator Pool    Allergies: Patient has no known allergies  Instructions Given: Angiogram Instructions    Dialysis: Patient is not on dialysis  Return Visit Required Prior to Procedure: No     Consent: I certify that patient has signed, printed, timed, and dated their surgery consent  I certify that the patient's LEGAL NAME and DATE OF BIRTH are written in the upper left corner on BOTH sides of the consent  I certify that BOTH sides of the completed surgery consent have been scanned into the patient's Epic chart by myself on 8/10/2022  Yes, I have LABELED the consent in Epic as Consent for Vascular Procedure  For Surgical Clearances     Levels   1-3   ROUTE this encounter to [facility] Clearance Pool (AND)   [facility] Surgery Coordinator Pool     Level   4   ROUTE this encounter to [facility] Surgery Coordinator Pool       HYDRATION CLEARANCES   ONLY ROUTE TO  [facility] Clearance Pool     Patient does not require any pre operative clearance  Yes, I have ROUTED this encounter to [facility] Surgery Coordinator and/or [facility] Clearance Pool

## 2022-08-10 NOTE — PATIENT INSTRUCTIONS
1) PAD  -you have a blockage in the artery in the right thigh which is causing the cramping you feel in the right calf while walking    We call this "claudication"  -we are planning and angiogram procedure to try to treat this    2) Medications  -please continue taking your aspirin and statin medications; do not stop this prior to the procedure

## 2022-08-11 NOTE — TELEPHONE ENCOUNTER
Verified patient's insurance   CONFIRMED - Patient's insurance is [employer]   Is patient requesting a call when authorization has been obtained? Patient did not request a call  Surgery Date: 9/15/22  Primary Surgeon: JASON // Kecia Porras (NPI: [id number])  Assisting Surgeon: Not Applicable (N/A)  Facility: [facility] (Tax: [id number] / NPI: [id number])  Inpatient / Outpatient: Outpatient  Level: 4    Clearance Received: No clearance ordered  Consent Received: Yes, scanned into Epic on 8/10/22  Medication Hold / Last Dose: Not Applicable (N/A)  VQI Spreadsheet: Not Applicable (N/A)  IR Notified: 8/11/22  Rep   Notified: Not Applicable (N/A)  Equipment Needs: Not Applicable (N/A)  Vas Lab Requested: Not Applicable (N/A)  Patient Contacted: 8/11/22    Diagnosis: I70 [id number]  Procedure/ CPT Code(s): Angiogram // CPT: 06950, 01464, [id number] and 60140 // Procedure to take place in IR [Referral Based]    For varicose vein related procedures:   Last LEVDR: Not Applicable (N/A)  CEAP Classification: Not Applicable (N/A)  VCSS: Not Applicable (N/A)    Post Operative Date/ Time: To Be Determined (TBD)     Pt will have blood work done at [place] [address]

## 2022-09-08 ENCOUNTER — TELEPHONE (OUTPATIENT)
Dept: RADIOLOGY | Facility: HOSPITAL | Age: 82
End: 2022-09-08

## 2022-09-08 ENCOUNTER — APPOINTMENT (OUTPATIENT)
Dept: LAB | Facility: CLINIC | Age: 82
End: 2022-09-08
Payer: COMMERCIAL

## 2022-09-08 DIAGNOSIS — I70.211 ATHEROSCLEROSIS OF NATIVE ARTERY OF RIGHT LOWER EXTREMITY WITH INTERMITTENT CLAUDICATION (HCC): ICD-10-CM

## 2022-09-08 LAB
ANION GAP SERPL CALCULATED.3IONS-SCNC: 5 MMOL/L (ref 4–13)
BUN SERPL-MCNC: 18 MG/DL (ref 5–25)
CALCIUM SERPL-MCNC: 9 MG/DL (ref 8.4–10.2)
CHLORIDE SERPL-SCNC: 104 MMOL/L (ref 96–108)
CO2 SERPL-SCNC: 30 MMOL/L (ref 21–32)
CREAT SERPL-MCNC: 0.88 MG/DL (ref 0.6–1.3)
ERYTHROCYTE [DISTWIDTH] IN BLOOD BY AUTOMATED COUNT: 12.2 % (ref 11.6–15.1)
GFR SERPL CREATININE-BSD FRML MDRD: 61 ML/MIN/1.73SQ M
GLUCOSE P FAST SERPL-MCNC: 97 MG/DL (ref 65–99)
HCT VFR BLD AUTO: 43.7 % (ref 34.8–46.1)
HGB BLD-MCNC: 14.1 G/DL (ref 11.5–15.4)
INR PPP: 0.97 (ref 0.84–1.19)
MCH RBC QN AUTO: 32.4 PG (ref 26.8–34.3)
MCHC RBC AUTO-ENTMCNC: 32.3 G/DL (ref 31.4–37.4)
MCV RBC AUTO: 101 FL (ref 82–98)
PLATELET # BLD AUTO: 187 THOUSANDS/UL (ref 149–390)
PMV BLD AUTO: 11.5 FL (ref 8.9–12.7)
POTASSIUM SERPL-SCNC: 4.3 MMOL/L (ref 3.5–5.3)
PROTHROMBIN TIME: 13 SECONDS (ref 11.6–14.5)
RBC # BLD AUTO: 4.35 MILLION/UL (ref 3.81–5.12)
SODIUM SERPL-SCNC: 139 MMOL/L (ref 135–147)
WBC # BLD AUTO: 8.11 THOUSAND/UL (ref 4.31–10.16)

## 2022-09-08 PROCEDURE — 80048 BASIC METABOLIC PNL TOTAL CA: CPT

## 2022-09-08 PROCEDURE — 85027 COMPLETE CBC AUTOMATED: CPT

## 2022-09-08 PROCEDURE — 85610 PROTHROMBIN TIME: CPT

## 2022-09-08 PROCEDURE — 36415 COLL VENOUS BLD VENIPUNCTURE: CPT

## 2022-09-08 NOTE — PRE-PROCEDURE INSTRUCTIONS
Pre-procedure Instructions for Interventional Radiology  [address] [place] [address] [address] [phone]    You are scheduled for a/an Aortogram with right leg runoff with intervention  On Thursday 9/15/22  Your tentative arrival time is 0700  Short stay will notify you the day before your procedure with the exact arrival time and the location to arrive  To prepare for your procedure:  1  Please arrange for someone to drive you home after the procedure and stay with you until the next morning if you are instructed to do so  This is typically for patients receiving some type of sedative or anesthetic for the procedure  2  DO NOT EAT OR DRINK ANYTHING after midnight on the evening before your procedure including candy & gum   3  ONLY SIPS OF WATER with your medications are allowed on the morning of your procedure  4  TAKE ALL OF YOUR REGULAR MEDICATIONS THE MORNING OF YOUR PROCEDURE with sips of water! We may call you to stop some of your blood sugar, blood pressure and blood thinning medications depending on the procedure  Please take all of these medications unless we instruct you to stop them  5  If you have an allergy to x-ray dye, please contact Interventional Radiology for an x-ray dye preparation which usually consists of an oral steroid and Benadryl  The day of your procedure:  1  Bring a list of the medications you take at home  2  Bring medications you take for breathing problems (such as inhalers), medications for chest pain, or both  3  Bring a case for your glasses or contacts  4  Bring your insurance card and a form of photo ID   5  Please leave all valuables such as credit cards and jewelry at home  6  Report to the registration desk in the main lobby at the [facility]  Ask to be directed to [facility]    7  While your procedure is being performed, your family may wait in the Radiology Waiting Room on the 1st floor in Radiology  if they need to leave, they may provide a number to be called following the procedure  8  Be prepared to stay overnight just in case  Sometimes procedures will indicate the need for further observation or treatment  9  If you are scheduled for a follow-up visit with the Interventional Radiologist after your procedure, you will be called with a date and time  10  Covid vaccine plus 2 boosters completed      Special Instructions (Medications to stop taking before your procedure etc ):

## 2022-09-15 ENCOUNTER — HOSPITAL ENCOUNTER (OUTPATIENT)
Dept: RADIOLOGY | Facility: HOSPITAL | Age: 82
Discharge: HOME | End: 2022-09-15
Attending: SURGERY
Payer: COMMERCIAL

## 2022-09-15 VITALS
HEART RATE: 58 BPM | RESPIRATION RATE: 17 BRPM | OXYGEN SATURATION: 96 % | HEIGHT: 61 IN | SYSTOLIC BLOOD PRESSURE: 110 MMHG | TEMPERATURE: 98 F | BODY MASS INDEX: 20.11 KG/M2 | DIASTOLIC BLOOD PRESSURE: 63 MMHG | WEIGHT: 106.5 LBS

## 2022-09-15 DIAGNOSIS — I70.211 ATHEROSCLEROSIS OF NATIVE ARTERY OF RIGHT LOWER EXTREMITY WITH INTERMITTENT CLAUDICATION (HCC): ICD-10-CM

## 2022-09-15 PROCEDURE — 75710 ARTERY X-RAYS ARM/LEG: CPT | Performed by: SURGERY

## 2022-09-15 PROCEDURE — C1887 CATHETER, GUIDING: HCPCS

## 2022-09-15 PROCEDURE — C1769 GUIDE WIRE: HCPCS

## 2022-09-15 PROCEDURE — 75710 ARTERY X-RAYS ARM/LEG: CPT

## 2022-09-15 PROCEDURE — 99153 MOD SED SAME PHYS/QHP EA: CPT

## 2022-09-15 PROCEDURE — C2623 CATH, TRANSLUMIN, DRUG-COAT: HCPCS

## 2022-09-15 PROCEDURE — C1725 CATH, TRANSLUMIN NON-LASER: HCPCS

## 2022-09-15 PROCEDURE — 75625 CONTRAST EXAM ABDOMINL AORTA: CPT | Performed by: SURGERY

## 2022-09-15 PROCEDURE — 99152 MOD SED SAME PHYS/QHP 5/>YRS: CPT

## 2022-09-15 PROCEDURE — 76937 US GUIDE VASCULAR ACCESS: CPT | Performed by: SURGERY

## 2022-09-15 PROCEDURE — C1894 INTRO/SHEATH, NON-LASER: HCPCS

## 2022-09-15 PROCEDURE — 99152 MOD SED SAME PHYS/QHP 5/>YRS: CPT | Performed by: SURGERY

## 2022-09-15 PROCEDURE — 37224 HB FEM/POPL REVAS W/TLA: CPT

## 2022-09-15 PROCEDURE — C1760 CLOSURE DEV, VASC: HCPCS

## 2022-09-15 PROCEDURE — 75625 CONTRAST EXAM ABDOMINL AORTA: CPT

## 2022-09-15 PROCEDURE — 76937 US GUIDE VASCULAR ACCESS: CPT

## 2022-09-15 PROCEDURE — 37224 PR REVSC OPN/PRG FEM/POP W/ANGIOPLASTY UNI: CPT | Performed by: SURGERY

## 2022-09-15 RX ORDER — SODIUM CHLORIDE 9 MG/ML
125 INJECTION, SOLUTION INTRAVENOUS CONTINUOUS
Status: DISCONTINUED | OUTPATIENT
Start: 2022-09-15 | End: 2022-09-16 | Stop reason: HOSPADM

## 2022-09-15 RX ORDER — FENTANYL CITRATE 50 UG/ML
INJECTION, SOLUTION INTRAMUSCULAR; INTRAVENOUS CODE/TRAUMA/SEDATION MEDICATION
Status: COMPLETED | OUTPATIENT
Start: 2022-09-15 | End: 2022-09-15

## 2022-09-15 RX ORDER — HEPARIN SODIUM 1000 [USP'U]/ML
INJECTION, SOLUTION INTRAVENOUS; SUBCUTANEOUS CODE/TRAUMA/SEDATION MEDICATION
Status: COMPLETED | OUTPATIENT
Start: 2022-09-15 | End: 2022-09-15

## 2022-09-15 RX ORDER — OXYCODONE HYDROCHLORIDE AND ACETAMINOPHEN 5; 325 MG/1; MG/1
1 TABLET ORAL EVERY 4 HOURS PRN
Status: DISCONTINUED | OUTPATIENT
Start: 2022-09-15 | End: 2022-09-16 | Stop reason: HOSPADM

## 2022-09-15 RX ORDER — MIDAZOLAM HYDROCHLORIDE 2 MG/2ML
INJECTION, SOLUTION INTRAMUSCULAR; INTRAVENOUS CODE/TRAUMA/SEDATION MEDICATION
Status: COMPLETED | OUTPATIENT
Start: 2022-09-15 | End: 2022-09-15

## 2022-09-15 RX ORDER — IODIXANOL 320 MG/ML
100 INJECTION, SOLUTION INTRAVASCULAR
Status: COMPLETED | OUTPATIENT
Start: 2022-09-15 | End: 2022-09-15

## 2022-09-15 RX ADMIN — MIDAZOLAM 1 MG: 1 INJECTION INTRAMUSCULAR; INTRAVENOUS at 08:27

## 2022-09-15 RX ADMIN — HEPARIN SODIUM 5000 UNITS: 1000 INJECTION INTRAVENOUS; SUBCUTANEOUS at 09:08

## 2022-09-15 RX ADMIN — MIDAZOLAM 0.5 MG: 1 INJECTION INTRAMUSCULAR; INTRAVENOUS at 09:03

## 2022-09-15 RX ADMIN — FENTANYL CITRATE 50 MCG: 50 INJECTION INTRAMUSCULAR; INTRAVENOUS at 08:27

## 2022-09-15 RX ADMIN — SODIUM CHLORIDE 125 ML/HR: 0.9 INJECTION, SOLUTION INTRAVENOUS at 07:37

## 2022-09-15 RX ADMIN — FENTANYL CITRATE 25 MCG: 50 INJECTION INTRAMUSCULAR; INTRAVENOUS at 09:35

## 2022-09-15 RX ADMIN — IODIXANOL 60 ML: 320 INJECTION, SOLUTION INTRAVASCULAR at 09:47

## 2022-09-15 RX ADMIN — FENTANYL CITRATE 25 MCG: 50 INJECTION INTRAMUSCULAR; INTRAVENOUS at 09:03

## 2022-09-15 RX ADMIN — MIDAZOLAM 0.5 MG: 1 INJECTION INTRAMUSCULAR; INTRAVENOUS at 09:35

## 2022-09-15 NOTE — DISCHARGE INSTRUCTIONS
DISCHARGE INSTRUCTIONS  ARTERIOGRAM/ANGIOPLASTY/STENT    ACTIVITY: On the evening following the procedure, you should be mostly resting  Someone should remain with you during the evening and overnight following the procedure  On the day after your procedure, limit your activity to walking  Avoid heavy lifting (no more than 15 lbs) for the first three days  Walking up steps and normal activities may be resumed as you feel ready  You should not drive a car for at least two days following discharge from the hospital  You may ride in a car  If you have any questions regarding a particular activity, please discuss with your doctor or nurse before you are discharged  DIET:  Resume your normal diet  Drink more water than usual for the next 24 hours  PROCEDURE SITE: You may have a procedure site in your groin, arm, or foot  You may have surgical glue at your procedure site  The glue is used to cover the procedure site, assist in closure, and prevent contamination  This adhesive will darken and peel away on its own within one to two weeks  Do not pick at it  You should shower daily  Wash incision daily with soap and water, but do not rub or scrub the incision; rinse thoroughly and pat dry  Do not bathe in a tub or swim for the first 2 week following your procedure or if you have any open wounds  It is normal to have some bruising, swelling or discoloration around the procedure site  IF increasing redness, pain, or a bulge develops, call our office immediately  If present, you may remove the band-aid or steri-strips over your procedure site after two days  If you notice any active bleeding at the site, apply pressure to the site and call our office ([phone]) or [phone]  FOLLOW UP STUDIES:  Your doctor will discuss whether further treatments or follow-up studies are necessary at your first post procedure visit      FOLLOW UP APPOINTMENTS:  Making and keeping follow up appointments and ultrasound tests are important to your recovery  If you have difficulty making it to or keeping your follow up appointments, call the office  If you have increased pain, fever >101 5, increased drainage, redness or a bad smell at your surgery site, new coldness/numbness of your arm or leg, please call us immediately and GO directly to the ER  PLEASE CALL THE OFFICE IF YOU HAVE ANY QUESTIONS  [phone]  [phone] [facility] , [address], [place], [address]  [address], [address], [place], [address]  [address], [facility], [address] [facility]  [address] [facility], [facility], [facility], [address] [facility] [address]    [address], [address]st Floor, Lashonda Combs 34  [facility], [address] [facility], [address]  [address] [facility], [address] [facility], [address]  One [facility], [address] [facility], [facility],[address], Tamir Cortez 6  201 [facility], [facility] [place], [address] Renetta BUTLER

## 2022-09-15 NOTE — PROCEDURES
Procedures    Indication:  RLE short distance claudication    Procedure: Aortogram  RLE runoff  L CFA access w/ 6F sheath and Mynx closure  R SFA PTA [id number]  R SFA PTA w/ Lutonix DCB 5x220    2+ R DP at completion      Vascular Quality Initiative - Peripheral Vascular Intervention     Urgency: Elective    Functional Status:  Fully active; able to carry on all predisease activities without restriction  Ambulation: Amb = independently ambulatory    Leg Symptoms    Right: Moderate Claudication:  ischemic limb muscle pain that limits walking 1-2 blocks (300-600 feet, or 1-2 football fields)  Left: Asymptomatic:  documented peripheral arterial disease without symptoms of claudication or ischemic pain      Treatment of Native Artery to Maintain Bypass Patency?:  No    COVID Information  COVID Symptoms Pre-Procedure: Asymptomatic    Treatment Delayed by Pandemic: None    Access   Number of Sites: 1     Access Site 1:     Side 1: Left    Site 1: Femoral Retrograde    Access Guidance 1:U/S    Largest Sheath Size 1: 6 Fr  Closure Device 1: MynxGrip      Number of Closure Devices: 1     Closure Device Outcome: Closure device successful         Procedure  Fluoro Time: 15 5 minutes  Contrast Volume: Visipaque 60 ml  DAP: 3910 Gy cm2  CO2: no  Anticoagulant: Heparin  Protamine: No  If Creatinine is > 1 2 or missing, MELBA Prophylaxis IV saline     Treatment Details  Indication: Occlusive Disease,    Completion Assessment  Artery 1 treated: SFA        Right               Outflow: AT,PT,Peroneal: 2                  Was this Site previously treated?: No          TASC Grade: C          Total Treated Length: 22 cm          Total Occluded Length: 10 cm          Calcification: Focal (calcification on one side of artery < half length of lesion)          Number of Treatment types (Devices):    2           Device 1          Treatment Type: Plain Balloon         Device 2          Treatment Type: Special Balloon,  Drug Coated Balloon Diameter: 5 mm          Length: 220 mm            Concomitant: None          Technical result: Successful (stenosis <=30%)      None     Post Procedure  Patient currently taking: Statin, Yes      Antiplatelet Medication, Yes    Procedure Complications: No

## 2022-09-15 NOTE — SEDATION DOCUMENTATION
IR right LE angiogram with intervention by Dr Porras  Patient tolerated procedure well  Groin closed with mynx  Bed rest start time 1000  Report called to [facility]

## 2022-09-16 ENCOUNTER — TELEPHONE (OUTPATIENT)
Dept: VASCULAR SURGERY | Facility: CLINIC | Age: 82
End: 2022-09-16

## 2022-09-16 NOTE — TELEPHONE ENCOUNTER
It's not recommended to sit in a car for several hours after angiogram procedure as the access site is not healed completely  Being that she is traveling a week after angiogram it would be ok,however, she should make frequent stops about every 2 hours to ambulate and stretch her legs

## 2022-09-16 NOTE — TELEPHONE ENCOUNTER
Transferred Harry Ruiz to nursing dept to answer questions in reference to after care for homa done yesterday with Dr Cruz Oh  She has a trip planned for [place] to see her grandson play football

## 2022-09-16 NOTE — TELEPHONE ENCOUNTER
Pt called ? If ok for her to drive to [place] 1/96/96  She is s/p agram yesterday w/   Doctor  Pt states she is doing well and has no pain  She is ? Because she asked Dr Missy Feliz yesterday if she could drive to [place] and she told her no  However she read her d/c instructions and it says no driving for 2 days  She is thinking perhaps Dr Missy Feliz thought she meant this weekend when in fact she is not leaving until 9/22  She had made plans to visit her son and see her grandson play college football  If she cannot drive she is ? If it would be ok to fly  Advised Dr Missy Feliz currently off but I would route her ? To triage provider for advice  Pt is scheduled for f/u ov 9/29/22  Please advise

## 2022-09-16 NOTE — TELEPHONE ENCOUNTER
Pt notified of same, she states it is only a 3 5 hour drive and she will stop senior living to ambulate

## 2022-09-29 ENCOUNTER — OFFICE VISIT (OUTPATIENT)
Dept: VASCULAR SURGERY | Facility: CLINIC | Age: 82
End: 2022-09-29
Payer: COMMERCIAL

## 2022-09-29 VITALS
HEART RATE: 74 BPM | WEIGHT: 111 LBS | BODY MASS INDEX: 20.96 KG/M2 | RESPIRATION RATE: 18 BRPM | SYSTOLIC BLOOD PRESSURE: 128 MMHG | HEIGHT: 61 IN | DIASTOLIC BLOOD PRESSURE: 84 MMHG

## 2022-09-29 DIAGNOSIS — N18.31 STAGE 3A CHRONIC KIDNEY DISEASE (HCC): ICD-10-CM

## 2022-09-29 DIAGNOSIS — I70.211 ATHEROSCLEROSIS OF NATIVE ARTERY OF RIGHT LOWER EXTREMITY WITH INTERMITTENT CLAUDICATION (HCC): Primary | ICD-10-CM

## 2022-09-29 PROCEDURE — 99215 OFFICE O/P EST HI 40 MIN: CPT | Performed by: SURGERY

## 2022-09-29 RX ORDER — SODIUM FLUORIDE 6 MG/ML
PASTE, DENTIFRICE DENTAL
COMMUNITY
Start: 2022-09-14

## 2022-09-29 NOTE — PROGRESS NOTES
Assessment/Plan:    Pt is an 81 yo F w/ CKD, HTN, PAD w/ RLE claudication s/p RLE angiogram w/ R SFA DCB 9/15/22    Atherosclerosis of native artery of right lower extremity with intermittent claudication (HCC)  -     VAS lower limb arterial duplex, complete bilateral; Future  -RLE claudication, life-limiting  -reviewed LEADs which show R: 0 42/0/0 and L: 0 99/125/111 w/ R SFA occlusion  -now s/p RLE angiogram w/ R SFA occlusion s/p 5mm PTA w/ DCB 9/15/22  -symptoms now resolved completely  -will get LEADs in 3 months for new baseline  -f/u in 1 year or sooner if symptoms recur    Stage 3a chronic kidney disease ([facility] [address] )  -Cr: 0 88  GFR: 61    Medications  -continue ASA and statin       Subjective:      Patient ID: Toña Solis is a 80 y o  female  Patient is s/p Agram w/ RLE runoff on 9/15/22  Pt c/o aching pain in the Lt thigh  Pt denies claudication, rest pain, or wounds  Pt is on ASA 81 mg and Atorvastatin  HPI:    Patient returns after RLE angiogram     Patient is having cramping and heaviness in the right calf with walking  She is able to walk >1 mile but has to stop for breaks  She was just on a trip to [facility] and did lots of walking but her symptoms limited her  Denies LLE symptoms  Denies rest pain or wounds  Taking ASA and statin    Now s/p angiogram, she denies any claudication symptoms  SHe has been able to walk >1mile and took a trip to the [facility] for a football game  Had some L thigh ecchymosis, now resolved  The following portions of the patient's history were reviewed and updated as appropriate: allergies, current medications, past family history, past medical history, past social history, past surgical history and problem list     Review of Systems   Constitutional: Negative  HENT: Negative  Eyes: Negative  Respiratory: Negative  Negative for shortness of breath  Cardiovascular: Positive for leg swelling  Negative for chest pain  Gastrointestinal: Negative  Endocrine: Negative  Genitourinary: Negative  Musculoskeletal: Negative for arthralgias, gait problem and neck stiffness  Skin: Negative  Negative for wound  Allergic/Immunologic: Negative  Neurological: Negative  Hematological: Negative  Psychiatric/Behavioral: Negative  Objective:      /84 (BP Location: Left arm, Patient Position: Sitting)   Pulse 74   Resp 18   Ht 5' 1" (1 549 m)   Wt 50 3 kg (111 lb)   BMI 20 97 kg/m²          Physical Exam  Vitals and nursing note reviewed  Constitutional:       Appearance: She is well-developed  HENT:      Head: Normocephalic and atraumatic  Eyes:      Conjunctiva/sclera: Conjunctivae normal    Cardiovascular:      Rate and Rhythm: Normal rate and regular rhythm  Pulses:           Radial pulses are 2+ on the right side and 2+ on the left side  Femoral pulses are 2+ on the right side and 2+ on the left side  Popliteal pulses are 2+ on the right side  Dorsalis pedis pulses are 2+ on the right side and 2+ on the left side  Posterior tibial pulses are 0 on the right side and 0 on the left side  Heart sounds: Normal heart sounds  No murmur heard  Comments: No carotid bruits    L groin access site C/D/I, no hematoma or ecchymosis  Pulmonary:      Effort: Pulmonary effort is normal  No respiratory distress  Breath sounds: Normal breath sounds  No wheezing or rales  Abdominal:      General: There is no distension  Palpations: Abdomen is soft  Tenderness: There is no abdominal tenderness  There is no rebound  Musculoskeletal:         General: Normal range of motion  Cervical back: Normal range of motion and neck supple  Right lower leg: No edema  Left lower leg: No edema  Skin:     General: Skin is warm and dry        Comments: Very small scabbed area on the L anterior ankle with resolving ecchymosis   Neurological:      Mental Status: She is alert and oriented to person, place, and time  Psychiatric:         Behavior: Behavior normal            I have reviewed and made appropriate changes to the review of systems input by the medical assistant  Vitals:    09/29/22 1308   BP: 128/84   BP Location: Left arm   Patient Position: Sitting   Pulse: 74   Resp: 18   Weight: 50 3 kg (111 lb)   Height: 5' 1" (1 549 m)       Patient Active Problem List   Diagnosis    Benign hypertension with CKD (chronic kidney disease) stage III (HCC)    Stage 3a chronic kidney disease (HCC)    Renal cyst, acquired    Atherosclerosis of native artery of right lower extremity with intermittent claudication ([facility] [address] )       Past Surgical History:   Procedure Laterality Date    APPENDECTOMY      CERVICAL SPINE SURGERY      IR ABDOMINAL AORTAGRAM  9/15/2022       Family History   Problem Relation Age of Onset    Cancer Mother     Hypertension Sister     Heart disease Brother        Social History     Socioeconomic History    Marital status: Single     Spouse name: Not on file    Number of children: Not on file    Years of education: Not on file    Highest education level: Not on file   Occupational History    Not on file   Tobacco Use    Smoking status: Former Smoker    Smokeless tobacco: Never Used   Substance and Sexual Activity    Alcohol use:  Yes     Alcohol/week: 10 0 standard drinks     Types: 10 Glasses of wine per week    Drug use: No    Sexual activity: Never   Other Topics Concern    Not on file   Social History Narrative    Not on file     Social Determinants of Health     Financial Resource Strain: Not on file   Food Insecurity: Not on file   Transportation Needs: Not on file   Physical Activity: Not on file   Stress: Not on file   Social Connections: Not on file   Intimate Partner Violence: Not on file   Housing Stability: Not on file       No Known Allergies      Current Outpatient Medications:     Ascorbic Acid (VITAMIN C) 1000 MG tablet, Take 1 tablet by mouth daily, Disp: , Rfl:     aspirin (ECOTRIN LOW STRENGTH) 81 mg EC tablet, Take 81 mg by mouth daily, Disp: , Rfl:     atorvastatin (LIPITOR) 20 mg tablet, , Disp: , Rfl:     metoprolol succinate (TOPROL-XL) 50 mg 24 hr tablet, Take 50 mg by mouth daily, Disp: , Rfl:     olmesartan (BENICAR) 5 mg tablet, TAKE 2 TABLETS BY MOUTH DAILY, Disp: 180 tablet, Rfl: 3    PreviDent 5000 Booster Plus 1 1 % PSTE, , Disp: , Rfl:     azelastine (OPTIVAR) 0 05 % ophthalmic solution, , Disp: , Rfl:     tobramycin (TOBREX) 0 3 % SOLN, , Disp: , Rfl:

## 2022-09-29 NOTE — PATIENT INSTRUCTIONS
1) PAD  -you have a blockage in the artery in the right thigh which is causing the cramping you feel in the right calf while walking    We call this "claudication"  -we did an angiogram procedure and treated this blockage with balloons    2) Medications  -please continue taking your aspirin and statin medications; do not stop these

## 2022-10-26 ENCOUNTER — APPOINTMENT (OUTPATIENT)
Dept: LAB | Facility: CLINIC | Age: 82
End: 2022-10-26

## 2022-10-26 DIAGNOSIS — N18.30 BENIGN HYPERTENSION WITH CKD (CHRONIC KIDNEY DISEASE) STAGE III (HCC): ICD-10-CM

## 2022-10-26 DIAGNOSIS — I12.9 BENIGN HYPERTENSION WITH CKD (CHRONIC KIDNEY DISEASE) STAGE III (HCC): ICD-10-CM

## 2022-10-26 DIAGNOSIS — N18.31 STAGE 3A CHRONIC KIDNEY DISEASE (HCC): ICD-10-CM

## 2022-10-26 LAB
ANION GAP SERPL CALCULATED.3IONS-SCNC: 7 MMOL/L (ref 4–13)
BUN SERPL-MCNC: 16 MG/DL (ref 5–25)
CALCIUM SERPL-MCNC: 9.1 MG/DL (ref 8.4–10.2)
CHLORIDE SERPL-SCNC: 106 MMOL/L (ref 96–108)
CO2 SERPL-SCNC: 27 MMOL/L (ref 21–32)
CREAT SERPL-MCNC: 0.82 MG/DL (ref 0.6–1.3)
CREAT UR-MCNC: 20.8 MG/DL
GFR SERPL CREATININE-BSD FRML MDRD: 67 ML/MIN/1.73SQ M
GLUCOSE P FAST SERPL-MCNC: 102 MG/DL (ref 65–99)
MICROALBUMIN UR-MCNC: <5 MG/L (ref 0–20)
MICROALBUMIN/CREAT 24H UR: <24 MG/G CREATININE (ref 0–30)
POTASSIUM SERPL-SCNC: 4.1 MMOL/L (ref 3.5–5.3)
SODIUM SERPL-SCNC: 140 MMOL/L (ref 135–147)

## 2022-11-11 ENCOUNTER — APPOINTMENT (OUTPATIENT)
Dept: LAB | Facility: CLINIC | Age: 82
End: 2022-11-11

## 2022-11-11 DIAGNOSIS — E78.00 HYPERCHOLESTEROLEMIA: ICD-10-CM

## 2022-11-11 DIAGNOSIS — I10 HYPERTENSION, UNSPECIFIED TYPE: ICD-10-CM

## 2022-11-11 DIAGNOSIS — Z79.899 ENCOUNTER FOR LONG-TERM (CURRENT) USE OF OTHER MEDICATIONS: ICD-10-CM

## 2022-11-11 DIAGNOSIS — I25.10 CVD (CARDIOVASCULAR DISEASE): ICD-10-CM

## 2022-11-11 LAB
ANION GAP SERPL CALCULATED.3IONS-SCNC: 7 MMOL/L (ref 4–13)
BUN SERPL-MCNC: 18 MG/DL (ref 5–25)
CALCIUM SERPL-MCNC: 8.8 MG/DL (ref 8.4–10.2)
CHLORIDE SERPL-SCNC: 102 MMOL/L (ref 96–108)
CO2 SERPL-SCNC: 29 MMOL/L (ref 21–32)
CREAT SERPL-MCNC: 0.92 MG/DL (ref 0.6–1.3)
ERYTHROCYTE [DISTWIDTH] IN BLOOD BY AUTOMATED COUNT: 12.4 % (ref 11.6–15.1)
GFR SERPL CREATININE-BSD FRML MDRD: 58 ML/MIN/1.73SQ M
GLUCOSE SERPL-MCNC: 125 MG/DL (ref 65–140)
HCT VFR BLD AUTO: 46.3 % (ref 34.8–46.1)
HGB BLD-MCNC: 14.2 G/DL (ref 11.5–15.4)
MCH RBC QN AUTO: 31.3 PG (ref 26.8–34.3)
MCHC RBC AUTO-ENTMCNC: 30.7 G/DL (ref 31.4–37.4)
MCV RBC AUTO: 102 FL (ref 82–98)
PLATELET # BLD AUTO: 197 THOUSANDS/UL (ref 149–390)
PMV BLD AUTO: 11.7 FL (ref 8.9–12.7)
POTASSIUM SERPL-SCNC: 4.2 MMOL/L (ref 3.5–5.3)
RBC # BLD AUTO: 4.53 MILLION/UL (ref 3.81–5.12)
SODIUM SERPL-SCNC: 138 MMOL/L (ref 135–147)
T4 FREE SERPL-MCNC: 1.02 NG/DL (ref 0.76–1.46)
TSH SERPL DL<=0.05 MIU/L-ACNC: 1.97 UIU/ML (ref 0.45–4.5)
WBC # BLD AUTO: 6.64 THOUSAND/UL (ref 4.31–10.16)

## 2022-11-21 ENCOUNTER — OFFICE VISIT (OUTPATIENT)
Dept: NEPHROLOGY | Facility: CLINIC | Age: 82
End: 2022-11-21

## 2022-11-21 VITALS
BODY MASS INDEX: 20.77 KG/M2 | DIASTOLIC BLOOD PRESSURE: 76 MMHG | HEIGHT: 61 IN | WEIGHT: 110 LBS | SYSTOLIC BLOOD PRESSURE: 134 MMHG

## 2022-11-21 DIAGNOSIS — N28.1 RENAL CYST, ACQUIRED: ICD-10-CM

## 2022-11-21 DIAGNOSIS — N18.30 BENIGN HYPERTENSION WITH CKD (CHRONIC KIDNEY DISEASE) STAGE III (HCC): Primary | ICD-10-CM

## 2022-11-21 DIAGNOSIS — I12.9 BENIGN HYPERTENSION WITH CKD (CHRONIC KIDNEY DISEASE) STAGE III (HCC): Primary | ICD-10-CM

## 2022-11-21 DIAGNOSIS — N18.31 STAGE 3A CHRONIC KIDNEY DISEASE (HCC): ICD-10-CM

## 2022-11-21 NOTE — PROGRESS NOTES
NEPHROLOGY OUTPATIENT PROGRESS NOTE   Miriam Morgan 80 y o  female MRN: [id number]  DATE: 11/21/2022  Reason for visit:   Chief Complaint   Patient presents with   • Follow-up     CKD3     ASSESSMENT and PLAN:  CKD stage 3, baseline creatinine 0 9 to 1 1  -last serum creatinine overall stable 0 9 in November 2022  Recently had angiogram in September 2022, creatinine remains stable since then  -CKD likely secondary to long-term hypertension  -UA in July 2020 bland without hematuria or proteinuria  Urine microalbumin/creatinine ratio non significant in October 2022    -repeat urine microalbumin/creatinine ratio, BMP before next visit  - avoid nephrotoxins or NSAIDs  - renal ultrasound  In March 2022 shows mild bilateral renal atrophy with right kidney 8 6 cm, left kidney 9 3 cm, normal echogenicity, no hydronephrosis  Renal cysts as below  -recommended to hold Benicar on the day of the procedure if she needs IV contrast exposure in the future     Hypertension  -blood pressure overall acceptable in the office today  She does not check BP irregularly at home lately  -current regimen includes Benicar 10 mg daily, metoprolol 50 mg daily (mainly for palpitations)  -home BP machine was compared with our office readings previously which were fairly identical    -continue to monitor BP at home     Bilateral renal cyst  -  Repeat renal ultrasound in March 2022 shows simple bilateral renal cyst, no suspicious interval changes comparing to prior ultrasound  Diagnoses and all orders for this visit:    Benign hypertension with CKD (chronic kidney disease) stage III ([address] )  -     Basic metabolic panel; Future  -     Microalbumin / creatinine urine ratio; Future    Stage 3a chronic kidney disease (HCC)  -     Basic metabolic panel; Future  -     Microalbumin / creatinine urine ratio; Future    Renal cyst, acquired    Other orders  -     apixaban (Eliquis) 5 mg;  Take 5 mg by mouth 2 (two) times a day        SUBJECTIVE / HPI:  Julienne Lassiter [facility] medical issues of hypertension for more than 16 years, degenerative joint disease, arthritis, who presents for regular follow-up for renal failure, hypertension and renal cyst  Patient's baseline serum creatinine seems to be in the range of 0 9 to 1 1 going back to 2014  Last serum creatinine stable at baseline  she recently had angiogram for lower extremity, status post angioplasty in September 2022  Since then her symptoms are overall much improved  She is going to [place] tomorrow for Thanksgiving holidays  She has not been checking BP regularly at home lately  Overall feels good  Denies any symptoms  She denies any urinary complaint including no dysuria, no hematuria, no urgency or hesitancy  Denies any shortness of breath or chest pain  No significant NSAID exposure  REVIEW OF SYSTEMS:  More than 10 point review of systems were obtained and discussed in length with the patient  Complete review of systems were negative / unremarkable except mentioned above  PHYSICAL EXAM:  Vitals:    11/21/22 0753 11/21/22 0822   BP: 122/80 134/76   BP Location: Left arm    Patient Position: Sitting    Cuff Size: Adult    Weight: 49 9 kg (110 lb)    Height: 5' 1" (1 549 m)      Body mass index is 20 78 kg/m²  Physical Exam  Vitals reviewed  Constitutional:       Appearance: She is well-developed  HENT:      Head: Normocephalic and atraumatic  Right Ear: External ear normal       Left Ear: External ear normal    Eyes:      Conjunctiva/sclera: Conjunctivae normal    Cardiovascular:      Comments: No significant edema in legs  Pulmonary:      Effort: Pulmonary effort is normal       Breath sounds: Normal breath sounds  No wheezing or rales  Abdominal:      General: Bowel sounds are normal  There is no distension  Palpations: Abdomen is soft  Tenderness: There is no abdominal tenderness  Musculoskeletal:         General: No deformity  Lymphadenopathy:      Cervical: No cervical adenopathy  Skin:     Findings: No rash  Neurological:      Mental Status: She is alert and oriented to person, place, and time     Psychiatric:         Behavior: Behavior normal          PAST MEDICAL HISTORY:  Past Medical History:   Diagnosis Date   • Hypertension        PAST SURGICAL HISTORY:  Past Surgical History:   Procedure Laterality Date   • APPENDECTOMY     • CERVICAL SPINE SURGERY     • IR ABDOMINAL AORTAGRAM  9/15/2022       SOCIAL HISTORY:  Social History     Substance and Sexual Activity   Alcohol Use Yes   • Alcohol/week: 10 0 standard drinks   • Types: 10 Glasses of wine per week     Social History     Substance and Sexual Activity   Drug Use No     Social History     Tobacco Use   Smoking Status Former   Smokeless Tobacco Never       FAMILY HISTORY:  Family History   Problem Relation Age of Onset   • Cancer Mother    • Hypertension Sister    • Heart disease Brother        MEDICATIONS:    Current Outpatient Medications:   •  apixaban (Eliquis) 5 mg, Take 5 mg by mouth 2 (two) times a day, Disp: , Rfl:   •  Ascorbic Acid (VITAMIN C) 1000 MG tablet, Take 1 tablet by mouth daily, Disp: , Rfl:   •  aspirin (ECOTRIN LOW STRENGTH) 81 mg EC tablet, Take 81 mg by mouth daily, Disp: , Rfl:   •  atorvastatin (LIPITOR) 20 mg tablet, , Disp: , Rfl:   •  metoprolol succinate (TOPROL-XL) 50 mg 24 hr tablet, Take 50 mg by mouth daily, Disp: , Rfl:   •  olmesartan (BENICAR) 5 mg tablet, TAKE 2 TABLETS BY MOUTH DAILY, Disp: 180 tablet, Rfl: 3  •  PreviDent 5000 Booster Plus 1 1 % PSTE, , Disp: , Rfl:   •  azelastine (OPTIVAR) 0 05 % ophthalmic solution, , Disp: , Rfl:   •  tobramycin (TOBREX) 0 3 % SOLN, , Disp: , Rfl:     Lab Results:   Results for orders placed or performed in visit on 73/58/62   Basic metabolic panel   Result Value Ref Range    Sodium 138 135 - 147 mmol/L    Potassium 4 2 3 5 - 5 3 mmol/L    Chloride 102 96 - 108 mmol/L    CO2 29 21 - 32 mmol/L ANION GAP 7 4 - 13 mmol/L    BUN 18 5 - 25 mg/dL    Creatinine 0 92 0 60 - 1 30 mg/dL    Glucose 125 65 - 140 mg/dL    Calcium 8 8 8 4 - 10 2 mg/dL    eGFR 58 ml/min/1 73sq m   CBC and Platelet   Result Value Ref Range    WBC 6 64 4 31 - 10 16 Thousand/uL    RBC 4 53 3 81 - 5 12 Million/uL    Hemoglobin 14 2 11 5 - 15 4 g/dL    Hematocrit 46 3 (H) 34 8 - 46 1 %     (H) 82 - 98 fL    MCH 31 3 26 8 - 34 3 pg    MCHC 30 7 (L) 31 4 - 37 4 g/dL    RDW 12 4 11 6 - 15 1 %    Platelets 826 748 - 536 Thousands/uL    MPV 11 7 8 9 - 12 7 fL   TSH, 3rd generation   Result Value Ref Range    TSH 3RD GENERATON 1 969 0 450 - 4 500 uIU/mL   T4, free   Result Value Ref Range    Free T4 1 02 0 76 - 1 46 ng/dL

## 2023-01-05 ENCOUNTER — HOSPITAL ENCOUNTER (OUTPATIENT)
Dept: NON INVASIVE DIAGNOSTICS | Facility: CLINIC | Age: 83
Discharge: HOME | End: 2023-01-05

## 2023-01-05 DIAGNOSIS — I70.211 ATHEROSCLEROSIS OF NATIVE ARTERY OF RIGHT LOWER EXTREMITY WITH INTERMITTENT CLAUDICATION (HCC): ICD-10-CM

## 2023-01-09 ENCOUNTER — TRANSCRIBE ORDERS (OUTPATIENT)
Dept: VASCULAR SURGERY | Facility: CLINIC | Age: 83
End: 2023-01-09

## 2023-01-09 DIAGNOSIS — I73.9 PAD (PERIPHERAL ARTERY DISEASE) (HCC): Primary | ICD-10-CM

## 2023-01-09 DIAGNOSIS — I70.211 ATHEROSCLEROSIS OF NATIVE ARTERY OF RIGHT LOWER EXTREMITY WITH INTERMITTENT CLAUDICATION (HCC): ICD-10-CM

## 2023-07-10 ENCOUNTER — HOSPITAL ENCOUNTER (OUTPATIENT)
Dept: NON INVASIVE DIAGNOSTICS | Facility: CLINIC | Age: 83
Discharge: HOME | End: 2023-07-10
Payer: COMMERCIAL

## 2023-07-10 DIAGNOSIS — I73.9 PAD (PERIPHERAL ARTERY DISEASE) (HCC): ICD-10-CM

## 2023-07-10 PROCEDURE — 93925 LOWER EXTREMITY STUDY: CPT | Performed by: SURGERY

## 2023-07-10 PROCEDURE — 93922 UPR/L XTREMITY ART 2 LEVELS: CPT | Performed by: SURGERY

## 2023-07-10 PROCEDURE — 93923 UPR/LXTR ART STDY 3+ LVLS: CPT

## 2023-07-10 PROCEDURE — 93925 LOWER EXTREMITY STUDY: CPT

## 2023-07-11 ENCOUNTER — TELEPHONE (OUTPATIENT)
Dept: VASCULAR SURGERY | Facility: CLINIC | Age: 83
End: 2023-07-11

## 2023-07-11 NOTE — TELEPHONE ENCOUNTER
Attempted to contact patient to schedule appointment(s) listed below. Requested patient call [phone] option 3 to schedule appointment(s). Patient's appointment(s) are due on or after 9/1/23.     Dopplers  [] Abdominal Aorta Iliac (AOIL)  [] Carotid (CV)   [] Celiac and/or Mesenteric  [] Endovascular Aortic Repair (EVAR)   [] Hemodialysis Access (HD)   [] Lower Limb Arterial (KATHY)  [] Lower Limb Venous (LEV)  [] Lower Limb Venous Duplex with Reflux (LEVDR)  [] Renal Artery  [] Upper Limb Arterial (UEA)    [] Upper Limb Venous (UEV)              [] HUMPHREY and Waveform analysis     Advanced Imaging   [] CTA head/neck    [] CTA abdomen    [] CTA abdomen & pelvis    [] CT abdomen with/ without contrast  [] CT abdomen with contrast  [] CT abdomen without contrast    [] CT abdomen & pelvis with/ without contrast  [] CT abdomen & pelvis with contrast  [] CT abdomen & pelvis without contrast    Office Visit   [] New patient, patient last seen over 3 years ago  [] Risk factor modification (RFM)   [x] Follow up   [] Lost to follow up (LTFU)  Called patient & s/w pt/She was in car & could not schedule ov/1 yr follow up w/LMD/Rev KATHY 7/10/23/Pt will call us back to schedule ov

## 2023-08-15 ENCOUNTER — TELEPHONE (OUTPATIENT)
Dept: VASCULAR SURGERY | Facility: CLINIC | Age: 83
End: 2023-08-15

## 2023-08-17 DIAGNOSIS — I12.9 BENIGN HYPERTENSION WITH CKD (CHRONIC KIDNEY DISEASE) STAGE III (HCC): ICD-10-CM

## 2023-08-17 DIAGNOSIS — N18.30 BENIGN HYPERTENSION WITH CKD (CHRONIC KIDNEY DISEASE) STAGE III (HCC): ICD-10-CM

## 2023-08-17 RX ORDER — OLMESARTAN MEDOXOMIL 5 MG/1
TABLET ORAL
Qty: 180 TABLET | Refills: 3 | Status: SHIPPED | OUTPATIENT
Start: 2023-08-17

## 2023-09-25 RX ORDER — ESCITALOPRAM OXALATE 10 MG/1
TABLET ORAL
COMMUNITY
Start: 2023-07-05

## 2023-09-26 ENCOUNTER — OFFICE VISIT (OUTPATIENT)
Dept: VASCULAR SURGERY | Facility: CLINIC | Age: 83
End: 2023-09-26
Payer: COMMERCIAL

## 2023-09-26 VITALS
SYSTOLIC BLOOD PRESSURE: 130 MMHG | HEART RATE: 105 BPM | BODY MASS INDEX: 20.96 KG/M2 | DIASTOLIC BLOOD PRESSURE: 90 MMHG | WEIGHT: 111 LBS | HEIGHT: 61 IN | OXYGEN SATURATION: 98 %

## 2023-09-26 DIAGNOSIS — I73.9 PAD (PERIPHERAL ARTERY DISEASE) (HCC): Primary | ICD-10-CM

## 2023-09-26 PROCEDURE — 99213 OFFICE O/P EST LOW 20 MIN: CPT | Performed by: NURSE PRACTITIONER

## 2023-09-26 RX ORDER — CLOBETASOL PROPIONATE 0.5 MG/G
CREAM TOPICAL
COMMUNITY
Start: 2023-09-21

## 2023-09-26 NOTE — PROGRESS NOTES
Assessment/Plan:    Atherosclerosis of native artery of right lower extremity with intermittent claudication [facility])  58-year-old female with HTN, CKD3a, PAD s/p RLE angiogram R SFA occlusion PTA w/ DCB 9/15/22 (LUCIAN Porras) presents for results review and [facility]. - presents with c/o calf cramping after walking 15minutes. R>L. Denies rest pain, no wounds. She does not consider this lifestyle limiting.   - Patient now on Eliquis (Afib? New dx) ASA stopped by cardilogy  - R PT +1, doppler DP  - L palpable DP/PT    LEAD 7/10/23- no significant change or progression of disease  RIGHT: Diffuse disease noted in the proximal superficial femoral artery without significant focal stenosis. HUMPHREY 1.08/103/79    LEFT: No evidence of significant lower extremity arterial occlusive disease. HUMPHREY 1.11/156/60      Plan:  Lower extremity arterial ultrasound in 1 year (July '24)   Return after LEAD  Continue Eliquis   Continue Statin  Discuss Aspirin 81mg with cardiology  Continue daily dedicated walking  Call or return to office sooner with new or worsening symptoms we discussed in office today. Diagnoses and all orders for this visit:    PAD (peripheral artery disease) ([address])  -     VAS lower limb arterial duplex, complete bilateral; Future    Other orders  -     escitalopram (LEXAPRO) 10 mg tablet  -     clobetasol (TEMOVATE) 0.05 % cream; APPLY TOPICALLY TO AFFECTED AREAS TWICE DAILY          Subjective:      Patient ID: Sang Coehlo is a 80 y.o. female. Patient presents for yearly follow up and review of KATHY done 7/10/2023. She c/o cramping in BLE, R>L, when walking 15 minutes in both calves. She denies wounds. She is taking Eliquis and Atorvastatin.     HPI    The following portions of the patient's history were reviewed and updated as appropriate: allergies, current medications, past family history, past medical history, past social history, past surgical history and problem list.    Review of Systems   Constitutional: Negative. HENT: Negative. Eyes: Negative. Respiratory: Negative. Cardiovascular: Negative. Gastrointestinal: Negative. Endocrine: Negative. Genitourinary: Negative. Musculoskeletal: Negative. Skin: Negative. Allergic/Immunologic: Negative. Neurological: Negative. Hematological: Negative. Psychiatric/Behavioral: Negative. I have reviewed and made appropriate changes to the review of systems input by the medical assistant. Objective:      /90 (BP Location: Left arm, Patient Position: Sitting, Cuff Size: Standard)   Pulse 105   Ht 5' 1" (1.549 m)   Wt 50.3 kg (111 lb)   SpO2 98%   BMI 20.97 kg/m²          Physical Exam  Vitals reviewed. Constitutional:       General: She is not in acute distress. Appearance: She is normal weight. Cardiovascular:      Rate and Rhythm: Normal rate. Pulses:           Radial pulses are 2+ on the right side and 2+ on the left side. Femoral pulses are 2+ on the right side and 2+ on the left side. Dorsalis pedis pulses are 1+ on the right side and 2+ on the left side. Posterior tibial pulses are detected w/ Doppler on the right side and 2+ on the left side. Pulmonary:      Effort: Pulmonary effort is normal. No respiratory distress. Musculoskeletal:         General: Normal range of motion. Right lower leg: No edema. Left lower leg: No edema. Skin:     General: Skin is warm. Capillary Refill: Capillary refill takes less than 2 seconds. Neurological:      Mental Status: She is alert and oriented to person, place, and time. Sensory: No sensory deficit. Motor: No weakness.    Psychiatric:         Behavior: Behavior normal.         I have spent a total time of 25 minutes on 10/09/23 in caring for this patient including Diagnostic results, Instructions for management, Patient and family education, Risk factor reductions, Impressions, Documenting in the medical record, Reviewing / ordering tests, medicine, procedures   and Obtaining or reviewing history  . Vitals:    09/26/23 1411   BP: 130/90   BP Location: Left arm   Patient Position: Sitting   Cuff Size: Standard   Pulse: 105   SpO2: 98%   Weight: 50.3 kg (111 lb)   Height: 5' 1" (1.549 m)       Patient Active Problem List   Diagnosis   • Benign hypertension with CKD (chronic kidney disease) stage III (HCC)   • Stage 3a chronic kidney disease (HCC)   • Renal cyst, acquired   • Atherosclerosis of native artery of right lower extremity with intermittent claudication (HCC)       Past Surgical History:   Procedure Laterality Date   • APPENDECTOMY     • CERVICAL SPINE SURGERY     • IR ABDOMINAL AORTAGRAM  9/15/2022       Family History   Problem Relation Age of Onset   • Cancer Mother    • Hypertension Sister    • Heart disease Brother        Social History     Socioeconomic History   • Marital status: Single     Spouse name: Not on file   • Number of children: Not on file   • Years of education: Not on file   • Highest education level: Not on file   Occupational History   • Not on file   Tobacco Use   • Smoking status: Former   • Smokeless tobacco: Never   Substance and Sexual Activity   • Alcohol use:  Yes     Alcohol/week: 10.0 standard drinks of alcohol     Types: 10 Glasses of wine per week   • Drug use: No   • Sexual activity: Never   Other Topics Concern   • Not on file   Social History Narrative   • Not on file     Social Determinants of Health     Financial Resource Strain: Not on file   Food Insecurity: Not on file   Transportation Needs: Not on file   Physical Activity: Not on file   Stress: Not on file   Social Connections: Not on file   Intimate Partner Violence: Not on file   Housing Stability: Not on file       No Known Allergies      Current Outpatient Medications:   •  apixaban (Eliquis) 5 mg, Take 5 mg by mouth 2 (two) times a day, Disp: , Rfl:   •  Ascorbic Acid (VITAMIN C) 1000 MG tablet, Take 1 tablet by mouth daily, Disp: , Rfl:   •  atorvastatin (LIPITOR) 20 mg tablet, , Disp: , Rfl:   •  clobetasol (TEMOVATE) 0.05 % cream, APPLY TOPICALLY TO AFFECTED AREAS TWICE DAILY, Disp: , Rfl:   •  escitalopram (LEXAPRO) 10 mg tablet, , Disp: , Rfl:   •  metoprolol succinate (TOPROL-XL) 50 mg 24 hr tablet, Take 50 mg by mouth daily, Disp: , Rfl:   •  olmesartan (BENICAR) 5 mg tablet, TAKE 2 TABLETS BY MOUTH DAILY, Disp: 180 tablet, Rfl: 3  •  PreviDent 5000 Booster Plus 1.1 % PSTE, , Disp: , Rfl:   •  aspirin (ECOTRIN LOW STRENGTH) 81 mg EC tablet, Take 81 mg by mouth daily, Disp: , Rfl:   •  azelastine (OPTIVAR) 0.05 % ophthalmic solution, , Disp: , Rfl:   •  tobramycin (TOBREX) 0.3 % SOLN, , Disp: , Rfl:

## 2023-09-27 ENCOUNTER — APPOINTMENT (OUTPATIENT)
Dept: LAB | Facility: CLINIC | Age: 83
End: 2023-09-27
Payer: COMMERCIAL

## 2023-09-27 DIAGNOSIS — N18.31 STAGE 3A CHRONIC KIDNEY DISEASE (HCC): ICD-10-CM

## 2023-09-27 DIAGNOSIS — N18.30 BENIGN HYPERTENSION WITH CKD (CHRONIC KIDNEY DISEASE) STAGE III (HCC): ICD-10-CM

## 2023-09-27 DIAGNOSIS — I12.9 BENIGN HYPERTENSION WITH CKD (CHRONIC KIDNEY DISEASE) STAGE III (HCC): ICD-10-CM

## 2023-09-27 LAB
ANION GAP SERPL CALCULATED.3IONS-SCNC: 6 MMOL/L
BUN SERPL-MCNC: 19 MG/DL (ref 5–25)
CALCIUM SERPL-MCNC: 9.5 MG/DL (ref 8.4–10.2)
CHLORIDE SERPL-SCNC: 104 MMOL/L (ref 96–108)
CO2 SERPL-SCNC: 29 MMOL/L (ref 21–32)
CREAT SERPL-MCNC: 0.88 MG/DL (ref 0.6–1.3)
CREAT UR-MCNC: 47.7 MG/DL
GFR SERPL CREATININE-BSD FRML MDRD: 61 ML/MIN/1.73SQ M
GLUCOSE P FAST SERPL-MCNC: 107 MG/DL (ref 65–99)
MICROALBUMIN UR-MCNC: <7 MG/L
MICROALBUMIN/CREAT 24H UR: <15 MG/G CREATININE (ref 0–30)
POTASSIUM SERPL-SCNC: 4.4 MMOL/L (ref 3.5–5.3)
SODIUM SERPL-SCNC: 139 MMOL/L (ref 135–147)

## 2023-09-27 PROCEDURE — 82043 UR ALBUMIN QUANTITATIVE: CPT

## 2023-09-27 PROCEDURE — 82570 ASSAY OF URINE CREATININE: CPT

## 2023-09-27 PROCEDURE — 36415 COLL VENOUS BLD VENIPUNCTURE: CPT

## 2023-09-27 PROCEDURE — 80048 BASIC METABOLIC PNL TOTAL CA: CPT

## 2023-09-28 ENCOUNTER — TELEPHONE (OUTPATIENT)
Dept: VASCULAR SURGERY | Facility: CLINIC | Age: 83
End: 2023-09-28

## 2023-09-28 ENCOUNTER — TELEPHONE (OUTPATIENT)
Dept: NEPHROLOGY | Facility: CLINIC | Age: 83
End: 2023-09-28

## 2023-09-28 NOTE — TELEPHONE ENCOUNTER
Pt called. She requested to relay a message to [place], [address] [place] [address]. Pt stated that she saw Dr. Pascale Mena today. He did not restart her on the aspirin, and told her that she did not need it.

## 2023-10-09 NOTE — ASSESSMENT & PLAN NOTE
59-year-old female with HTN, CKD3a, PAD s/p RLE angiogram R SFA occlusion PTA w/ DCB 9/15/22 (LUCIAN Porras) presents for results review and [facility]. - presents with c/o calf cramping after walking 15minutes. R>L. Denies rest pain, no wounds. She does not consider this lifestyle limiting.   - Patient now on Eliquis (Afib? New dx) ASA stopped by cardilogy  - R PT +1, doppler DP  - L palpable DP/PT    LEAD 7/10/23- no significant change or progression of disease  RIGHT: Diffuse disease noted in the proximal superficial femoral artery without significant focal stenosis. HUMPHREY 1.08/103/79    LEFT: No evidence of significant lower extremity arterial occlusive disease. HUMPHREY 1.11/156/60      Plan:  Lower extremity arterial ultrasound in 1 year (July '24)   Return after LEAD  Continue Eliquis   Continue Statin  Discuss Aspirin 81mg with cardiology  Continue daily dedicated walking  Call or return to office sooner with new or worsening symptoms we discussed in office today.

## 2023-10-19 ENCOUNTER — HOSPITAL ENCOUNTER (OUTPATIENT)
Dept: VASCULAR ULTRASOUND | Facility: HOSPITAL | Age: 83
Discharge: HOME | End: 2023-10-19
Payer: COMMERCIAL

## 2023-10-19 DIAGNOSIS — I73.9 PAD (PERIPHERAL ARTERY DISEASE) (HCC): ICD-10-CM

## 2023-10-19 PROCEDURE — 93923 UPR/LXTR ART STDY 3+ LVLS: CPT

## 2023-10-19 PROCEDURE — 93925 LOWER EXTREMITY STUDY: CPT

## 2023-10-24 ENCOUNTER — OFFICE VISIT (OUTPATIENT)
Dept: VASCULAR SURGERY | Facility: CLINIC | Age: 83
End: 2023-10-24
Payer: COMMERCIAL

## 2023-10-24 VITALS
OXYGEN SATURATION: 96 % | WEIGHT: 110 LBS | DIASTOLIC BLOOD PRESSURE: 72 MMHG | SYSTOLIC BLOOD PRESSURE: 122 MMHG | BODY MASS INDEX: 20.77 KG/M2 | RESPIRATION RATE: 18 BRPM | HEIGHT: 61 IN | HEART RATE: 104 BPM

## 2023-10-24 DIAGNOSIS — M25.552 LEFT HIP PAIN: ICD-10-CM

## 2023-10-24 DIAGNOSIS — I73.9 PAD (PERIPHERAL ARTERY DISEASE) (HCC): Primary | ICD-10-CM

## 2023-10-24 DIAGNOSIS — I73.9 CLAUDICATION OF RIGHT LOWER EXTREMITY (HCC): ICD-10-CM

## 2023-10-24 PROCEDURE — 99213 OFFICE O/P EST LOW 20 MIN: CPT | Performed by: NURSE PRACTITIONER

## 2023-10-24 NOTE — ASSESSMENT & PLAN NOTE
22-year-old female with HTN, CKD3a, PAD h/o RLE angiogram R SFA occlusion PTA w/ DCB 9/15/22 (LUCIAN Porras) presents with complaints of left groin pain/pulling when walking.    -Patient's appointment originally made to review updated LE AD after complaints of continued RLE calf claudication however at this time patient reports this has resolved. She denies any RLE symptoms.  -Complains of left groin pain starting approximately 6 days ago. Denies trauma or injury  -Denies claudication, rest pain. No wounds    Updated LEAD 10/19/23 reviewed  RIGHT : Diffuse disease noted in the proximal superficial femoral artery without significant focal stenosis, however calcific plaque may me obscuring higher  velocities. Evidence suggestive of TIb/Peroneal occlusive disease, retrograde flow noted in the distal posterior tibial artery. HUMPHREY 0.84/188/77    LEFT: 50-75% stenosis noted in the mid SFA. Evidence of occlusion noted in the distal posterior tibial artery. HUMPHREY 1.05/98/70  - Patient taking Eliquis (Afib? New dx) ASA stopped by cardilogy  -Palpable femoral pulses bilaterally.  -Palpable +1 DP pulses bilaterally  -Nonpalpable right PT, left PT Doppler signal    Plan:  -Lower extremity arterial ultrasound in 1 year w/ return OV   -Continue Eliquis   -Continue Statin  -Continue daily dedicated walking for PAD as tolerated  - Return to PT? (Referral placed)/ follow up with PCP with new on set groin pain. MSK ? No vascular etiology  -Call or return to office sooner with new or worsening symptoms we discussed in office today.

## 2023-10-24 NOTE — PATIENT INSTRUCTIONS
Lower extremity arterial duplex in 1 year with return office visit  Continue Eliquis and statin  Daily dedicated walking  Call or return sooner with new or worsening symptoms. Peripheral Artery Disease   AMBULATORY CARE:   Peripheral artery disease (PAD)  is narrow, weak, or blocked arteries. It may affect any arteries outside of your heart and brain. PAD is usually the result of a buildup of fat and cholesterol, also called plaque, along your artery walls. Inflammation, a blood clot, or abnormal cell growth could also block your arteries. PAD prevents normal blood flow to your legs and arms. You are at risk of an amputation if poor blood flow keeps wounds from healing or causes gangrene (tissue death). Without treatment, PAD can also cause a heart attack or stroke. Common symptoms include:  Mild PAD usually does not cause symptoms.  As the disease worsens over time, you may have the following:  Pain or cramps in your leg or hip while you walk    A numb, weak, or heavy feeling in your legs    Dry, scaly, red, or pale skin on your legs    Thick or brittle nails, or hair loss on your arms and legs    Foot sores that will not heal    Burning or aching in your feet and toes while resting (this may be worse when you lie down)    Call your local emergency number (911 in the [address]) if:   You have any of the following signs of a heart attack:      Squeezing, pressure, or pain in your chest    You may  also have any of the following:     Discomfort or pain in your back, neck, jaw, stomach, or arm    Shortness of breath    Nausea or vomiting    Lightheadedness or a sudden cold sweat    You have any of the following signs of a stroke:      Numbness or drooping on one side of your face     Weakness in an arm or leg    Confusion or difficulty speaking    Dizziness, a severe headache, or vision loss    Seek care immediately if:   You have sores or wounds that will not heal.    You notice black or discolored skin on your arm or leg.    Your skin is cool to the touch. Call your doctor if:   You have leg pain when you walk 1/8 mile (200 meters) or less, even with treatment. Your legs are red, dry, or pale, even with treatment. You have questions or concerns about your condition or care. Treatment for PAD  can help reduce your risk of a heart attack, stroke, or amputation. You may need more than one of the following:  Medicines  may be given to prevent blood clots and reduce the risk of a heart attack or stroke. You may be given medicine to help prevent your PAD from getting worse. A supervised exercise program  helps you stay active in normal daily activities. Healthcare providers will help you safely walk or do strength training exercises 3 times a week for 30 to 60 minutes. You will do this for several months, then transition to walking on your own. Angioplasty  is a procedure to open your artery so blood can flow through normally. A thin tube called a catheter is used to insert a small balloon into your artery. The balloon is inflated to open your blocked artery, and then removed. A tube called a stent may be placed in your artery to hold it open. Bypass surgery  is used to make a new connection to your artery with a vein from another part of your body, or an artificial graft. The vein or graft is attached to your artery above and below your blockage. This allows blood to flow around the blocked portion of your artery. Manage and prevent PAD:   Walk for 30 to 60 minutes at least 4 times a week. Your healthcare provider may also refer you to a supervised exercise program. The program helps increase how far you can walk without pain. It also helps you stay active in normal daily activities         Do not smoke. Nicotine and other chemicals in cigarettes and cigars can worsen PAD. They can also increase your risk for a heart attack or stroke.  Ask your healthcare provider for information if you currently smoke and need help to quit. E-cigarettes or smokeless tobacco still contain nicotine. Talk to your healthcare provider before you use these products. Manage other health conditions. Take your medicines as directed and follow your healthcare provider's instructions if you have high blood pressure or high cholesterol. Perform foot care and check your blood sugar levels as directed if you have diabetes. Eat heart-healthy foods. Eat whole grains, fruits, and vegetables every day. Limit salt and high-fat foods. Ask your healthcare provider for more information on a heart healthy diet. Ask what a healthy weight is for you. Your healthcare provider can help you create a healthy weight-loss plan, if needed. Follow up with your doctor as directed:  Write down your questions so you remember to ask them during your visits. © Copyright Samira Sin 2023 Information is for End User's use only and may not be sold, redistributed or otherwise used for commercial purposes. The above information is an  only. It is not intended as medical advice for individual conditions or treatments. Talk to your doctor, nurse or pharmacist before following any medical regimen to see if it is safe and effective for you.

## 2023-10-24 NOTE — PROGRESS NOTES
Assessment/Plan:    PAD (peripheral artery disease) [facility])  57-year-old female with HTN, CKD3a, PAD h/o RLE angiogram R SFA occlusion PTA w/ DCB 9/15/22 (LUCIAN Porras) presents with complaints of left groin pain/pulling when walking.    -Patient's appointment originally made to review updated LE AD after complaints of continued RLE calf claudication however at this time patient reports this has resolved. She denies any RLE symptoms.  -Complains of left groin pain starting approximately 6 days ago. Denies trauma or injury  -Denies claudication, rest pain. No wounds    Updated LEAD 10/19/23 reviewed  RIGHT : Diffuse disease noted in the proximal superficial femoral artery without significant focal stenosis, however calcific plaque may me obscuring higher  velocities. Evidence suggestive of TIb/Peroneal occlusive disease, retrograde flow noted in the distal posterior tibial artery. HUMPHREY 0.84/188/77    LEFT: 50-75% stenosis noted in the mid SFA. Evidence of occlusion noted in the distal posterior tibial artery. HUMPHREY 1.05/98/70  - Patient taking Eliquis (Afib? New dx) ASA stopped by cardilogy  -Palpable femoral pulses bilaterally.  -Palpable +1 DP pulses bilaterally  -Nonpalpable right PT, left PT Doppler signal    Plan:  -Lower extremity arterial ultrasound in 1 year w/ return OV   -Continue Eliquis   -Continue Statin  -Continue daily dedicated walking for PAD as tolerated  - Return to PT? (Referral placed)/ follow up with PCP with new on set groin pain. MSK ? No vascular etiology  -Call or return to office sooner with new or worsening symptoms we discussed in office today. Diagnoses and all orders for this visit:    PAD (peripheral artery disease) (HCC)  -     VAS lower limb arterial duplex, complete bilateral; Future    Left hip pain  -     Ambulatory Referral to Physical Therapy;  Future    Claudication of right lower extremity (HCC)  -     VAS lower limb arterial duplex, complete bilateral; Future Subjective:      Patient ID: Paul Biggs is a 80 y.o. female. Patient had a KATHY on 10/19/23. Pt c/o Lt groin pain for the past few weeks. Pt states that it is causing her to limp. Pt denies injury. Pt normally can walk about 3 miles without issues. HPI    See assessment and plan      The following portions of the patient's history were reviewed and updated as appropriate: allergies, current medications, past family history, past medical history, past social history, past surgical history, and problem list.    Review of Systems   Constitutional: Negative. HENT: Negative. Eyes: Negative. Respiratory: Negative. Cardiovascular: Negative. Gastrointestinal: Negative. Endocrine: Negative. Genitourinary: Negative. Musculoskeletal:  Positive for gait problem (limp). Lt groin pain   Skin: Negative. Allergic/Immunologic: Negative. Neurological:  Negative for dizziness, weakness and numbness. Hematological: Negative. Psychiatric/Behavioral: Negative. I have reviewed and made appropriate changes to the review of systems input by the medical assistant. Objective:      /72 (BP Location: Left arm, Patient Position: Sitting)   Pulse 104   Resp 18   Ht 5' 1" (1.549 m)   Wt 49.9 kg (110 lb)   SpO2 96%   BMI 20.78 kg/m²          Physical Exam  Vitals reviewed. Constitutional:       General: She is not in acute distress. Appearance: She is normal weight. Cardiovascular:      Rate and Rhythm: Normal rate. Pulses:           Radial pulses are 2+ on the right side and 2+ on the left side. Femoral pulses are 2+ on the right side and 2+ on the left side. Dorsalis pedis pulses are 1+ on the right side and 1+ on the left side. Posterior tibial pulses are 0 on the right side and detected w/ Doppler on the left side. Pulmonary:      Effort: Pulmonary effort is normal. No respiratory distress.    Musculoskeletal:         General: No tenderness or signs of injury. Normal range of motion. Right lower leg: No edema. Left lower leg: No edema. Skin:     General: Skin is warm. Capillary Refill: Capillary refill takes less than 2 seconds. Neurological:      Mental Status: She is alert and oriented to person, place, and time. Sensory: No sensory deficit. Motor: No weakness. Psychiatric:         Behavior: Behavior normal.         I have spent a total time of 25 minutes on 10/24/23 in caring for this patient including Diagnostic results, Instructions for management, Patient and family education, Impressions, Counseling / Coordination of care, Documenting in the medical record, Reviewing / ordering tests, medicine, procedures  , and Obtaining or reviewing history  . Vitals:    10/24/23 1352   BP: 122/72   BP Location: Left arm   Patient Position: Sitting   Pulse: 104   Resp: 18   SpO2: 96%   Weight: 49.9 kg (110 lb)   Height: 5' 1" (1.549 m)       Patient Active Problem List   Diagnosis    Benign hypertension with CKD (chronic kidney disease) stage III (HCC)    Stage 3a chronic kidney disease (HCC)    Renal cyst, acquired    Atherosclerosis of native artery of right lower extremity with intermittent claudication (HCC)    PAD (peripheral artery disease) ([address] )       Past Surgical History:   Procedure Laterality Date    APPENDECTOMY      CERVICAL SPINE SURGERY      IR ABDOMINAL AORTAGRAM  9/15/2022       Family History   Problem Relation Age of Onset    Cancer Mother     Hypertension Sister     Heart disease Brother        Social History     Socioeconomic History    Marital status: Single     Spouse name: Not on file    Number of children: Not on file    Years of education: Not on file    Highest education level: Not on file   Occupational History    Not on file   Tobacco Use    Smoking status: Former    Smokeless tobacco: Never   Substance and Sexual Activity    Alcohol use:  Yes     Alcohol/week: 10.0 standard drinks of alcohol     Types: 10 Glasses of wine per week    Drug use: No    Sexual activity: Never   Other Topics Concern    Not on file   Social History Narrative    Not on file     Social Determinants of Health     Financial Resource Strain: Not on file   Food Insecurity: Not on file   Transportation Needs: Not on file   Physical Activity: Not on file   Stress: Not on file   Social Connections: Not on file   Intimate Partner Violence: Not on file   Housing Stability: Not on file       No Known Allergies      Current Outpatient Medications:     apixaban (Eliquis) 5 mg, Take 5 mg by mouth 2 (two) times a day, Disp: , Rfl:     Ascorbic Acid (VITAMIN C) 1000 MG tablet, Take 1 tablet by mouth daily, Disp: , Rfl:     atorvastatin (LIPITOR) 20 mg tablet, , Disp: , Rfl:     clobetasol (TEMOVATE) 0.05 % cream, APPLY TOPICALLY TO AFFECTED AREAS TWICE DAILY, Disp: , Rfl:     escitalopram (LEXAPRO) 10 mg tablet, , Disp: , Rfl:     metoprolol succinate (TOPROL-XL) 50 mg 24 hr tablet, Take 50 mg by mouth daily, Disp: , Rfl:     olmesartan (BENICAR) 5 mg tablet, TAKE 2 TABLETS BY MOUTH DAILY, Disp: 180 tablet, Rfl: 3    PreviDent 5000 Booster Plus 1.1 % PSTE, , Disp: , Rfl:     aspirin (ECOTRIN LOW STRENGTH) 81 mg EC tablet, Take 81 mg by mouth daily, Disp: , Rfl:     azelastine (OPTIVAR) 0.05 % ophthalmic solution, , Disp: , Rfl:     tobramycin (TOBREX) 0.3 % SOLN, , Disp: , Rfl:

## 2023-11-06 ENCOUNTER — EVALUATION (OUTPATIENT)
Dept: PHYSICAL THERAPY | Facility: CLINIC | Age: 83
End: 2023-11-06
Payer: COMMERCIAL

## 2023-11-06 DIAGNOSIS — M54.16 LUMBAR RADICULOPATHY: ICD-10-CM

## 2023-11-06 DIAGNOSIS — M25.552 LEFT HIP PAIN: Primary | ICD-10-CM

## 2023-11-06 PROCEDURE — 97161 PT EVAL LOW COMPLEX 20 MIN: CPT | Performed by: PHYSICAL THERAPIST

## 2023-11-06 PROCEDURE — 97110 THERAPEUTIC EXERCISES: CPT | Performed by: PHYSICAL THERAPIST

## 2023-11-06 PROCEDURE — 97112 NEUROMUSCULAR REEDUCATION: CPT | Performed by: PHYSICAL THERAPIST

## 2023-11-06 NOTE — PROGRESS NOTES
PT Evaluation     Today's date: 2023  Patient name: Eugenia Paul  : 1940  MRN: [id number]  Referring provider: MIRNA Núñez  Dx:   Encounter Diagnosis     ICD-10-CM    1. Left hip pain  M25.552 Ambulatory Referral to Physical Therapy      2. Lumbar radiculopathy  M54.16                      Assessment  Assessment details: Pt presents with signs and symptoms synonymous of admitting diagnosis as well as possible lumbar radiculopathy with mechanical diagnosis of lumbar derangement with DP of LEONARD with table support. Pt presents with pain, decreased strength, decreased range, flexibility, as well as tolerance to activity and postural awareness. Pt would benefit skilled PT intervention in order to address these impairments in order to be able to perform all desired activities with minimal to nil symptom exacerbation. Thank you very much for this kind referral.     Impairments: abnormal gait, abnormal or restricted ROM, abnormal movement, impaired balance, impaired physical strength, lacks appropriate home exercise program, pain with function, poor posture  and poor body mechanics  Understanding of Dx/Px/POC: good   Prognosis: good    Goals  STG 4 Weeks:  Decrease pain at worst to 5  Improve range to L Hip ER to equal that of R.  LS Ext Mod  Improve strength to improve B/L LE strength to 4/5  Independent with HEP  LTG 8 Weeks:  Decrease pain at worst to 3  Improve range to L Hip ER without pain. LS Ext to min  Improve strength to improve B/L 5/5.    Able to perform all desired activities with minimal to nil symptom exacerbation      Plan  Patient would benefit from: skilled physical therapy  Planned modality interventions: cryotherapy and thermotherapy: hydrocollator packs  Planned therapy interventions: joint mobilization, manual therapy, neuromuscular re-education, patient education, postural training, strengthening, stretching, therapeutic activities, therapeutic exercise, therapeutic training, transfer training, IADL retraining, home exercise program, graded motor, graded exercise, graded activity, gait training, functional ROM exercises, flexibility, abdominal trunk stabilization, activity modification, balance, behavior modification and body mechanics training  Frequency: 2x week  Duration in weeks: 10      Subjective Evaluation    History of Present Illness  Date of onset: 2023  Mechanism of injury: Pt is an 80 yofemale who presents today stating that she developed L sided hip and groin pain about 3-4 weeks ago. Pt reports that she was meeting with her vascular specialty and when she was at the office for R LE claudication, and this is improving. Pt reports that the R LE which having the performance of an Angiogram has improve. Pt reports that the L groin seems to be getting better. Pt reports that it seems to be getting better. Pt reports that there are periods of time without pain. But at worst it can get up to a 8/10, difficulty with walking. Pt reports that it now seems to be traveling a little bit mid thigh. Denies glute symptoms, denies symptoms beyond L knee. Pt reports that she does have a history of low back pain, that doesn't prevent her from doing activities. Pt reports that there is no difficulty with sleeping due to the low back or L groin pain. Pt denies change in bowel or bladder. Pt reports that no issues with sitting, occasional symptoms with walking greater distances, standing is without issue. Pt reports that her goals are to reduce pain, improve endurance with walking and be able to return to recreational exercise and her trip to [facility] with her family.     Quality of life: good    Patient Goals  Patient goals for therapy: increased strength, return to sport/leisure activities, increased motion, improved balance and decreased pain    Pain  Current pain ratin  At best pain ratin  At worst pain ratin  Quality: dull ache, sharp and tight  Relieving factors: change in position and rest  Aggravating factors: walking and stair climbing  Progression: improved      Diagnostic Tests  Ultrasound findings: normalX-ray: normal      Objective     Active Range of Motion   Left Hip   Flexion: 110 degrees   External rotation (90/90): 40 degrees with pain  Internal rotation (90/90): 40 degrees     Right Hip   Flexion: 110 degrees   External rotation (90/90): 45 degrees   Internal rotation (90/90): 45 degrees     Additional Active Range of Motion Details  Forward head, rounded shoulders, Lordosis  B/L Sensation intact to L3,4,5,S1,S2  DTR Patella 2+ b/l [place] 2+ b/l. Postural correction L groin/hip  Repeated LS motion   Flex  Ext LEONARD improved range of LS, improved range of hip, improved strength. SB R  SB L  LE Strength  Hip   L Flex 3+ Ext 5 Abd 5 Add 5  R Flex 5 Ext 5 Abd 5 Add 5  LE Screen strong and painless. Joint Mobility  Palpation  Sts  Pain with ER* L. Flowsheet Rows      Flowsheet Row Most Recent Value   PT/OT G-Codes    Current Score 62   Projected Score 75               Precautions: Falls, HTN, CS Fusion > 5yrs. 1 follow up on 11/13/23 before long vacation and trip. Manuals 11/6                                                                Neuro Re-Ed             Education and progression 10 Min                                                                                          Ther Ex             LEONARD table support 4 x 10 B. Progression? Ther Activity             Hip L ROM B            Hip L flexion MMT B            Other concerns? Modalities             HP/CP PRN.

## 2023-11-07 ENCOUNTER — TELEPHONE (OUTPATIENT)
Dept: NEPHROLOGY | Facility: CLINIC | Age: 83
End: 2023-11-07

## 2023-11-07 DIAGNOSIS — I12.9 BENIGN HYPERTENSION WITH CKD (CHRONIC KIDNEY DISEASE) STAGE III (HCC): ICD-10-CM

## 2023-11-07 DIAGNOSIS — N18.31 STAGE 3A CHRONIC KIDNEY DISEASE (HCC): Primary | ICD-10-CM

## 2023-11-07 DIAGNOSIS — N18.30 BENIGN HYPERTENSION WITH CKD (CHRONIC KIDNEY DISEASE) STAGE III (HCC): ICD-10-CM

## 2023-11-07 NOTE — TELEPHONE ENCOUNTER
----- Message from Wyatt Holden MD sent at 11/7/2023 11:38 AM EST -----  Regarding: RE: No Active Labs- 11/14 Appt  Renal function panel, UACR, UPCR. Thank you.  ----- Message -----  From: Lexii December  Sent: 11/6/2023   9:15 AM EST  To: Wyatt Holden MD  Subject: No Active Labs- 11/14 Appt                       Hi Dr. Paty Cummins! This patient is seeing you on 11/14 for a transfer of care from Dr. Bhanu Santiago. No active labs from our office, and last labs are from 9/27. Diagnoses are CKD-3A, HTN, and Bilateral Renal Cysts.  Please let me know if/what lab work you would like

## 2023-11-07 NOTE — TELEPHONE ENCOUNTER
Called and spoke to the patient to remind her to complete ordered lab work. Patient agreeable and states that she will complete it at a [facility].

## 2023-11-08 ENCOUNTER — APPOINTMENT (OUTPATIENT)
Dept: LAB | Facility: CLINIC | Age: 83
End: 2023-11-08
Payer: COMMERCIAL

## 2023-11-08 DIAGNOSIS — I12.9 BENIGN HYPERTENSION WITH CKD (CHRONIC KIDNEY DISEASE) STAGE III (HCC): ICD-10-CM

## 2023-11-08 DIAGNOSIS — N18.30 BENIGN HYPERTENSION WITH CKD (CHRONIC KIDNEY DISEASE) STAGE III (HCC): ICD-10-CM

## 2023-11-08 DIAGNOSIS — N18.31 STAGE 3A CHRONIC KIDNEY DISEASE (HCC): ICD-10-CM

## 2023-11-08 LAB
ALBUMIN SERPL BCP-MCNC: 3.9 G/DL (ref 3.5–5)
ANION GAP SERPL CALCULATED.3IONS-SCNC: 6 MMOL/L
BUN SERPL-MCNC: 22 MG/DL (ref 5–25)
CALCIUM SERPL-MCNC: 8.9 MG/DL (ref 8.4–10.2)
CHLORIDE SERPL-SCNC: 104 MMOL/L (ref 96–108)
CO2 SERPL-SCNC: 28 MMOL/L (ref 21–32)
CREAT SERPL-MCNC: 0.83 MG/DL (ref 0.6–1.3)
CREAT UR-MCNC: 122 MG/DL
CREAT UR-MCNC: 125.5 MG/DL
GFR SERPL CREATININE-BSD FRML MDRD: 65 ML/MIN/1.73SQ M
GLUCOSE P FAST SERPL-MCNC: 104 MG/DL (ref 65–99)
MICROALBUMIN UR-MCNC: 23.2 MG/L
MICROALBUMIN/CREAT 24H UR: 18 MG/G CREATININE (ref 0–30)
PHOSPHATE SERPL-MCNC: 3.8 MG/DL (ref 2.3–4.1)
POTASSIUM SERPL-SCNC: 4.3 MMOL/L (ref 3.5–5.3)
PROT UR-MCNC: 17 MG/DL
PROT/CREAT UR: 0.14 MG/G{CREAT} (ref 0–0.1)
SODIUM SERPL-SCNC: 138 MMOL/L (ref 135–147)

## 2023-11-08 PROCEDURE — 82570 ASSAY OF URINE CREATININE: CPT

## 2023-11-08 PROCEDURE — 82043 UR ALBUMIN QUANTITATIVE: CPT

## 2023-11-08 PROCEDURE — 84156 ASSAY OF PROTEIN URINE: CPT

## 2023-11-08 PROCEDURE — 80069 RENAL FUNCTION PANEL: CPT

## 2023-11-08 PROCEDURE — 36415 COLL VENOUS BLD VENIPUNCTURE: CPT

## 2023-11-13 ENCOUNTER — OFFICE VISIT (OUTPATIENT)
Dept: PHYSICAL THERAPY | Facility: CLINIC | Age: 83
End: 2023-11-13
Payer: COMMERCIAL

## 2023-11-13 DIAGNOSIS — M54.16 LUMBAR RADICULOPATHY: ICD-10-CM

## 2023-11-13 DIAGNOSIS — M25.552 LEFT HIP PAIN: Primary | ICD-10-CM

## 2023-11-13 PROCEDURE — 97112 NEUROMUSCULAR REEDUCATION: CPT | Performed by: PHYSICAL THERAPIST

## 2023-11-13 PROCEDURE — 97110 THERAPEUTIC EXERCISES: CPT | Performed by: PHYSICAL THERAPIST

## 2023-11-13 NOTE — PROGRESS NOTES
Daily Note     Today's date: 2023  Patient name: Ignacio Galvan  : 1940  MRN: [id number]  Referring provider: MIRNA Johns  Dx:   Encounter Diagnosis     ICD-10-CM    1. Left hip pain  M25.552       2. Lumbar radiculopathy  M54.16                      Subjective: Pt presents today stating that she is feeling well. Only time she is having challenges is with stairs. States that she will have to hold her appointments until she returns from traveling with family. Objective: See treatment diary below      Assessment: Reviewed her HEP, postural awareness and how to make her exercises easier to perform t/o the day. Will follow up when she returns from her long trip visiting family. PT and pt were in accord. Precautions: Falls, HTN, CS Fusion > 5yrs. 1 follow up on 23 before long vacation and trip. Manuals                                                                Neuro Re-Ed             Education and progression 10 Min 10 min                                                                                         Ther Ex             LEONARD table support 4 x 10 B.  4 x 10           Progression? LEONARD with UE support. 2 x 10                                                               Ther Activity             Hip L ROM B B           Hip L flexion MMT B B           Other concerns? Modalities             HP/CP PRN.

## 2023-11-14 ENCOUNTER — OFFICE VISIT (OUTPATIENT)
Dept: NEPHROLOGY | Facility: CLINIC | Age: 83
End: 2023-11-14
Payer: COMMERCIAL

## 2023-11-14 VITALS
HEART RATE: 81 BPM | DIASTOLIC BLOOD PRESSURE: 80 MMHG | HEIGHT: 61 IN | SYSTOLIC BLOOD PRESSURE: 130 MMHG | WEIGHT: 112 LBS | BODY MASS INDEX: 21.14 KG/M2

## 2023-11-14 DIAGNOSIS — N28.1 BILATERAL RENAL CYSTS: ICD-10-CM

## 2023-11-14 DIAGNOSIS — I73.9 PERIPHERAL ARTERIAL DISEASE (HCC): ICD-10-CM

## 2023-11-14 DIAGNOSIS — R80.9 PROTEINURIA, UNSPECIFIED TYPE: ICD-10-CM

## 2023-11-14 DIAGNOSIS — N18.2 STAGE 2 CHRONIC KIDNEY DISEASE: Primary | ICD-10-CM

## 2023-11-14 DIAGNOSIS — N26.1 RENAL ATROPHY, BILATERAL: ICD-10-CM

## 2023-11-14 PROCEDURE — 99214 OFFICE O/P EST MOD 30 MIN: CPT | Performed by: INTERNAL MEDICINE

## 2023-11-14 NOTE — PROGRESS NOTES
NEPHROLOGY OFFICE VISIT   Tate Velazquez 80 y.o. female MRN: [id number]  11/14/2023    Reason for Visit: Chronic kidney disease    ASSESSMENT and PLAN:  It was a pleasure evaluating your patient in the office today. Thank you for allowing our team to participate in the care of Ms. Tate Velazquez. Please do not hesitate to contact our team if further issues/questions shall arise in the interim.      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  - Etiology/risk factors: Longstanding history of hypertension, vascular disease, age-related nephron loss  - Baseline Cr: 0.9-1.1, most recently 0.83 11/2023  - Urinalysis: Historically bland  - Proteinuria: UACR 18 mg/g, UPCR 140 mg 11/2023  - Imaging: Right kidney 8.6 cm, left kidney 9.3 cm, mild bilateral renal atrophy  - Kidney failure risk index < 5% chance of kidney failure needing dialysis in next 5 years  - Given stability of kidney function, would recommend follow-up in 1 year  - Discussed risk factor reduction to slow progression of chronic kidney disease  Intensive blood pressure control as below  Lipid control, on Lipitor 20 mg daily  Avoidance of NSAIDs     # Proteinuria  - Mild proteinuria, UPCR 140 mg 11/2023  - Etiology most likely in setting of hypertensive nephrosclerosis and age-related nephrosclerosis  - Currently on Benicar 10 mg daily and would continue current dose  - Repeat urine protein assessment in 1 year    # Bilateral renal cysts  - Bilateral simple renal cysts  - No further work-up    # Hypertension/Volume   - Goal BP ~ 130/80  - Volume status: Euvolemic  - Status: Blood pressure well controlled and at goal  - Current antihypertensive regimen: Benicar 10 mg daily, Toprol-XL 50 mg daily  - Changes: No changes to antihypertensive regimen today as blood pressure is well controlled and at goal    # Screening for Anemia   - Target Hb: More than 10 g/dL  - Most recent hemoglobin: 14.2 g/dL    # Electrolytes/Acid Base status   - Electrolytes and acid base status within normal limits    # CKD Mineral and Bone parameters  - Goal Ca 8.5-10 mg/dL, goal Phos 2.7-4.6 mg/dL, goal iPTH 30-70 pg/mL  - PTH 33.3 12/2020, serum calcium 8.9, serum phosphorus 3.8 all at goal    # History of peripheral arterial disease  - Status post right lower extremity angiogram right SFA occlusion with angioplasty 09/2022  - Notes reviewed from vascular surgery and plans noted for surveillance arterial ultrasound in 1 year and continuation of Eliquis/statin  - Discussed avoidance of NSAIDs    HPI:  Since last visit: Patient has been doing well. She has followed with vascular surgery for ongoing symptoms of leg pain and is currently on surveillance. She denies dyspnea. She denies leg swelling. She denies any trouble with urination. 80-year-old female who has history of hypertension for more than 15 years. She has history of peripheral arterial disease and follows with vascular surgery. >> Major risk factors for CKD  - Diabetes: No   - Hypertension: Yes for 30 years    - Age ? 54 years: Yes   - Family history of kidney disease: No    - Obesity or metabolic syndrome: No     >> Medical history evaluation   - Prior kidney disease or dialysis: No   - Incidental hematuria in the past: No   - Urinary symptoms: No  - History of foamy or frothy urine: No   - History of nephrolithiasis: No   - Diseases that share risk factors with CKD: HTN, PAD  - Systemic diseases that might affect kidney: No  - History of use of medications that might affect renal function: No    PATIENT INSTRUCTIONS:    Patient Instructions   Your kidney function remains stable. Your blood pressure is well controlled. Continue your current medications. We will repeat blood work and urine test in 1 year before next office visit.     OBJECTIVE:  Current Weight: Weight - Scale: 50.8 kg (112 lb)  Vitals:    11/14/23 1344   BP: 130/80   BP Location: Left arm   Patient Position: Sitting   Cuff Size: Standard   Pulse: 81 Weight: 50.8 kg (112 lb)   Height: 5' 1" (1.549 m)    Body mass index is 21.16 kg/m². REVIEW OF SYSTEMS:    Review of Systems   Constitutional:  Negative for chills and fever. HENT:  Negative for ear pain and sore throat. Eyes:  Negative for pain and visual disturbance. Respiratory:  Negative for cough and shortness of breath. Cardiovascular:  Negative for chest pain and palpitations. Gastrointestinal:  Negative for abdominal pain and vomiting. Genitourinary:  Negative for dysuria and hematuria. Musculoskeletal:  Negative for arthralgias and back pain. Skin:  Negative for color change and rash. Neurological:  Negative for seizures and syncope. All other systems reviewed and are negative. Past Medical History:   Diagnosis Date    Hypertension        Past Surgical History:   Procedure Laterality Date    APPENDECTOMY      CERVICAL SPINE SURGERY      IR ABDOMINAL AORTAGRAM  9/15/2022       Family History   Problem Relation Age of Onset    Cancer Mother     Hypertension Sister     Heart disease Brother         Social History     Substance and Sexual Activity   Alcohol Use Yes    Alcohol/week: 10.0 standard drinks of alcohol    Types: 10 Glasses of wine per week     Social History     Substance and Sexual Activity   Drug Use No     Social History     Tobacco Use   Smoking Status Former   Smokeless Tobacco Never       PHYSICAL EXAM:      Physical Exam  Constitutional:       Appearance: Normal appearance. HENT:      Head: Normocephalic and atraumatic. Mouth/Throat:      Mouth: Mucous membranes are moist.      Pharynx: Oropharynx is clear. Cardiovascular:      Rate and Rhythm: Normal rate and regular rhythm. Pulses: Normal pulses. Heart sounds: Normal heart sounds. Pulmonary:      Effort: Pulmonary effort is normal.      Breath sounds: Normal breath sounds. Abdominal:      General: Bowel sounds are normal.      Palpations: Abdomen is soft.    Musculoskeletal: General: Normal range of motion. Right lower leg: No edema. Left lower leg: No edema. Skin:     General: Skin is warm and dry. Neurological:      General: No focal deficit present. Mental Status: She is alert and oriented to person, place, and time. Mental status is at baseline. Psychiatric:         Mood and Affect: Mood normal.         Behavior: Behavior normal.         Thought Content:  Thought content normal.         Judgment: Judgment normal.         Medications:    Current Outpatient Medications:     apixaban (Eliquis) 5 mg, Take 5 mg by mouth 2 (two) times a day, Disp: , Rfl:     Ascorbic Acid (VITAMIN C) 1000 MG tablet, Take 1 tablet by mouth daily, Disp: , Rfl:     atorvastatin (LIPITOR) 20 mg tablet, , Disp: , Rfl:     clobetasol (TEMOVATE) 0.05 % cream, APPLY TOPICALLY TO AFFECTED AREAS TWICE DAILY, Disp: , Rfl:     escitalopram (LEXAPRO) 10 mg tablet, Take 10 mg by mouth daily, Disp: , Rfl:     metoprolol succinate (TOPROL-XL) 50 mg 24 hr tablet, Take 50 mg by mouth daily, Disp: , Rfl:     olmesartan (BENICAR) 5 mg tablet, TAKE 2 TABLETS BY MOUTH DAILY, Disp: 180 tablet, Rfl: 3    PreviDent 5000 Booster Plus 1.1 % PSTE, , Disp: , Rfl:     aspirin (ECOTRIN LOW STRENGTH) 81 mg EC tablet, Take 81 mg by mouth daily (Patient not taking: Reported on 11/14/2023), Disp: , Rfl:     azelastine (OPTIVAR) 0.05 % ophthalmic solution, , Disp: , Rfl:     tobramycin (TOBREX) 0.3 % SOLN, , Disp: , Rfl:     Laboratory Results:  Results from last 7 days   Lab Units 11/08/23  0757   POTASSIUM mmol/L 4.3   CHLORIDE mmol/L 104   CO2 mmol/L 28   BUN mg/dL 22   CREATININE mg/dL 0.83   CALCIUM mg/dL 8.9   PHOSPHORUS mg/dL 3.8       Results for orders placed or performed in visit on 11/08/23   Renal function panel   Result Value Ref Range    Albumin 3.9 3.5 - 5.0 g/dL    Calcium 8.9 8.4 - 10.2 mg/dL    Phosphorus 3.8 2.3 - 4.1 mg/dL    BUN 22 5 - 25 mg/dL    Creatinine 0.83 0.60 - 1.30 mg/dL    Sodium 138 135 - 147 mmol/L    Potassium 4.3 3.5 - 5.3 mmol/L    Chloride 104 96 - 108 mmol/L    CO2 28 21 - 32 mmol/L    ANION GAP 6 mmol/L    eGFR 65 ml/min/1.73sq m    Glucose, Fasting 104 (H) 65 - 99 mg/dL   Albumin / creatinine urine ratio   Result Value Ref Range    Creatinine, Ur 125.5 Reference range not established. mg/dL    Albumin,U,Random 23.2 (H) <20.0 mg/L    Albumin Creat Ratio 18 0 - 30 mg/g creatinine   Protein / creatinine ratio, urine   Result Value Ref Range    Creatinine, Ur 122.0 Reference range not established. mg/dL    Protein Urine Random 17 Reference range not established. mg/dL    Prot/Creat Ratio, Ur 0.14 (H) 0.00 - 0.10

## 2023-11-14 NOTE — PATIENT INSTRUCTIONS
Your kidney function remains stable. Your blood pressure is well controlled. Continue your current medications. We will repeat blood work and urine test in 1 year before next office visit.

## 2024-06-24 ENCOUNTER — TELEPHONE (OUTPATIENT)
Dept: NEPHROLOGY | Facility: CLINIC | Age: 84
End: 2024-06-24

## 2024-07-24 ENCOUNTER — TELEPHONE (OUTPATIENT)
Age: 84
End: 2024-07-24

## 2024-07-24 NOTE — TELEPHONE ENCOUNTER
Received call from pt stating she had moved out of [place]. Pt wanted to cancel her vascular ultrasound scheduled for October of this year. Went ahead and removed her from the recall list as well as she will not be needing our services any longer.

## 2024-08-17 ENCOUNTER — TELEPHONE (OUTPATIENT)
Dept: OTHER | Facility: OTHER | Age: 84
End: 2024-08-17

## 2024-08-17 NOTE — TELEPHONE ENCOUNTER
Pt moved out of state and stated she received a call from the office, pt would like to be taken off contact list

## 2024-09-04 DIAGNOSIS — N18.30 BENIGN HYPERTENSION WITH CKD (CHRONIC KIDNEY DISEASE) STAGE III (HCC): ICD-10-CM

## 2024-09-04 DIAGNOSIS — I12.9 BENIGN HYPERTENSION WITH CKD (CHRONIC KIDNEY DISEASE) STAGE III (HCC): ICD-10-CM

## 2024-09-04 RX ORDER — OLMESARTAN MEDOXOMIL 5 MG/1
10 TABLET ORAL DAILY
Qty: 180 TABLET | Refills: 3 | Status: SHIPPED | OUTPATIENT
Start: 2024-09-04